# Patient Record
Sex: MALE | Race: WHITE | NOT HISPANIC OR LATINO | Employment: OTHER | URBAN - METROPOLITAN AREA
[De-identification: names, ages, dates, MRNs, and addresses within clinical notes are randomized per-mention and may not be internally consistent; named-entity substitution may affect disease eponyms.]

---

## 2017-02-08 ENCOUNTER — OFFICE VISIT (OUTPATIENT)
Dept: SLEEP MEDICINE | Facility: CLINIC | Age: 67
End: 2017-02-08
Payer: COMMERCIAL

## 2017-02-08 VITALS
DIASTOLIC BLOOD PRESSURE: 77 MMHG | BODY MASS INDEX: 29.4 KG/M2 | HEIGHT: 71 IN | OXYGEN SATURATION: 97 % | WEIGHT: 210 LBS | HEART RATE: 93 BPM | SYSTOLIC BLOOD PRESSURE: 148 MMHG

## 2017-02-08 DIAGNOSIS — G47.33 OBSTRUCTIVE SLEEP APNEA: Primary | ICD-10-CM

## 2017-02-08 DIAGNOSIS — G47.33 OSA (OBSTRUCTIVE SLEEP APNEA): ICD-10-CM

## 2017-02-08 PROBLEM — L40.9 PSORIASIS: Status: ACTIVE | Noted: 2017-01-26

## 2017-02-08 PROCEDURE — 99204 OFFICE O/P NEW MOD 45 MIN: CPT | Performed by: PHYSICIAN ASSISTANT

## 2017-02-08 NOTE — PATIENT INSTRUCTIONS

## 2017-02-08 NOTE — MR AVS SNAPSHOT
After Visit Summary   2/8/2017    Narinder Edmonds    MRN: [id number]           Patient Information     Date Of Birth          1950        Visit Information        Provider Department      2/8/2017 8:00 AM Jaclyn Gray PA [facility] [place]        Today's Diagnoses     Obstructive sleep apnea    -  1       Care Instructions      Your BMI is Body mass index is 29.3 kg/(m^2).  Weight management is a personal decision.  If you are interested in exploring weight loss strategies, the following discussion covers the approaches that may be successful. Body mass index (BMI) is one way to tell whether you are at a healthy weight, overweight, or obese. It measures your weight in relation to your height.  A BMI of 18.5 to 24.9 is in the healthy range. A person with a BMI of 25 to 29.9 is considered overweight, and someone with a BMI of 30 or greater is considered obese. More than two-thirds of American adults are considered overweight or obese.  Being overweight or obese increases the risk for further weight gain. Excess weight may lead to heart disease and diabetes.  Creating and following plans for healthy eating and physical activity may help you improve your health.  Weight control is part of healthy lifestyle and includes exercise, emotional health, and healthy eating habits. Careful eating habits lifelong are the mainstay of weight control. Though there are significant health benefits from weight loss, long-term weight loss with diet alone may be very difficult to achieve- studies show long-term success with dietary management in less than 10% of people. Attaining a healthy weight may be especially difficult to achieve in those with severe obesity. In some cases, medications, devices and surgical management might be considered.  What can you do?  If you are overweight or obese and are interested in methods for weight loss, you should discuss this with your provider.     Consider reducing  daily calorie intake by 500 calories.     Keep a food journal.     Avoiding skipping meals, consider cutting portions instead.    Diet combined with exercise helps maintain muscle while optimizing fat loss. Strength training is particularly important for building and maintaining muscle mass. Exercise helps reduce stress, increase energy, and improves fitness. Increasing exercise without diet control, however, may not burn enough calories to loose weight.       Start walking three days a week 10-20 minutes at a time    Work towards walking thirty minutes five days a week     Eventually, increase the speed of your walking for 1-2 minutes at time    In addition, we recommend that you review healthy lifestyles and methods for weight loss available through the National Institutes of Health patient information sites:  http://win.niddk.nih.gov/publications/index.htm    And look into health and wellness programs that may be available through your health insurance provider, employer, local community center, or joaquin club.    Weight management plan: Patient was referred to their PCP to discuss a diet and exercise plan.          Follow-ups after your visit        Who to contact     If you have questions or need follow up information about today's clinic visit or your schedule please contact [facility] directly at [phone].  Normal or non-critical lab and imaging results will be communicated to you by MyChart, letter or phone within 4 business days after the clinic has received the results. If you do not hear from us within 7 days, please contact the clinic through [employer] or phone. If you have a critical or abnormal lab result, we will notify you by phone as soon as possible.  Submit refill requests through [employer] or call your pharmacy and they will forward the refill request to us. Please allow 3 business days for your refill to be completed.          Additional Information About Your Visit       "  MyChart Information     [employer] lets you send messages to your doctor, view your test results, renew your prescriptions, schedule appointments and more. To sign up, go to [email or web address] . Click on \"Log in\" on the left side of the screen, which will take you to the Welcome page. Then click on \"Sign up Now\" on the right side of the page.     You will be asked to enter the access code listed below, as well as some personal information. Please follow the directions to create your username and password.     Your access code is: [email or web address]  Expires: 2017  8:43 AM     Your access code will  in 90 days. If you need help or a new code, please call your [facility] clinic or [phone].        Care EveryWhere ID     This is your Care EveryWhere ID. This could be used by other organizations to access your [facility] medical records  [id number]        Your Vitals Were     Pulse Height BMI (Body Mass Index) Pulse Oximetry          93 1.803 m (5' 11\") 29.30 kg/m2 97%         Blood Pressure from Last 3 Encounters:   17 149/81   12 129/73    Weight from Last 3 Encounters:   17 95.255 kg (210 lb)   12 90.719 kg (200 lb)              We Performed the Following     Comprehensive DME        Primary Care Provider Office Phone # Fax #    Jeff Beltran [phone]       [facility] [address] [address]        Thank you!     Thank you for choosing [facility]  for your care. Our goal is always to provide you with excellent care. Hearing back from our patients is one way we can continue to improve our services. Please take a few minutes to complete the written survey that you may receive in the mail after your visit with us. Thank you!             Your Updated Medication List - Protect others around you: Learn how to safely use, store and throw away your medicines at www.disposemymeds.org.          This list is accurate as of: " 2/8/17  8:43 AM.  Always use your most recent med list.                   Brand Name Dispense Instructions for use    ACETAMINOPHEN PO      Take  by mouth.       * clobetasol propionate 0.05 % Sham      Externally apply  topically.       * Clobetasol 17 Propionate Powd          * clobetasol propionate 0.05 % Sham      AS PRESCRIBED       * Clobetasol Propionate Powd      AS PRESCRIBED       * DERMAZINC SPRAY EX      Externally apply  topically.       * DERMAZINC SPRAY 0.25 % Liqd   Generic drug:  Pyrithione Zinc      AS DIRECTED 1-2 TIMES DAILY       FELDENE 20 MG capsule   Generic drug:  piroxicam      1 CAPSULE ORALLY DAILY       FLEXERIL PO      Take  by mouth.       fluticasone 27.5 MCG/SPRAY spray    VERAMYST     Spray 2 sprays in nostril daily.       Multivitamin  /Iron Tabs      None Entered       PRILOSEC PO      Take  by mouth.       SYNTHROID PO      Take  by mouth.       tobramycin-dexamethasone 0.3-0.1 % ophthalmic susp    TOBRADEX     1 drop every 4 hours (while awake).       VITAMIN C PO      Take  by mouth.       * Notice:  This list has 6 medication(s) that are the same as other medications prescribed for you. Read the directions carefully, and ask your doctor or other care provider to review them with you.

## 2017-02-08 NOTE — NURSING NOTE
"Chief Complaint   Patient presents with     Sleep Problem     Consult re-establish care, has CPAP, check pressures       Initial /81 mmHg  Pulse 93  Ht 1.803 m (5' 11\")  Wt 95.255 kg (210 lb)  BMI 29.30 kg/m2  SpO2 97% Estimated body mass index is 29.3 kg/(m^2) as calculated from the following:    Height as of this encounter: 1.803 m (5' 11\").    Weight as of this encounter: 95.255 kg (210 lb).  Medication Reconciliation: complete    "

## 2017-02-08 NOTE — PROGRESS NOTES
Sleep Consultation:    Date on this visit: 2/8/2017      Primary Physician: Jeff Beltran     Chief Complaint   Patient presents with     Sleep Problem     Consult re-establish care, has CPAP, check pressures       Narinder YARI Edmonds is a 66 year old male who had a sleep study at [facility] in October 2010(AHI 26, lowest oxygen saturation was 83%) for excessive daytime sleepiness. He was prescribed auto-CPAP 8-12 cm/H2O. He underwent an all night PAP titration on 6/19/2011(205#, CPAP 15 cm/H20). He is currently using 9-15 cm/H20.  Compliance data is available today. Compliance download recorded 30 days, 0 days of no use, 30 days of use.  Average time of PAP use on the days used is 6 hours 1 minutes. Average large leak was 2 minutes 50 seconds. AHI 4.3 events per hour.  Narinder is tolerating their CPAP well. His original complaints of excessive daytime sleepiness have improved with treatment but not completely remedied with reported ESS of 20.  PAP Mask is a  full face.  DME is [facility].   Narinder  does not snore when wearing his  CPAP. Patient's bed partner does not complain of snoring, kicking, punching, gasping, choking and snorting. Narinder sleeps on his back and side. He does not have witnessed apneas when wearing their CPAP. He has occasional morning headaches, denies no morning dry mouth, morning confusion and restless legs. He denies any bruxism, sleep walking, sleep talking, dream enactment, sleep paralysis, cataplexy and hypnogogic/hypnopompic hallucinations.    Narinder has gained 5 pounds since their last sleep study.    Narinder goes to sleep at 10:00 PM during the week. He wakes up at 4:45 AM with an alarm. He falls asleep in 10-15 minutes.  Narinder denies difficulty falling asleep.  He wakes up 2-4 times a night for 10-15 minutes before falling back to sleep.  Narinder wakes up mostly due to nasal obstruction. For the nasal obstruction, he has seen ENT and will be undergoing septoturbinoplasty in the near future.  on weekends, Narinder goes to sleep at 11:00 PM.  He wakes up at 8:00 AM without an alarm. He falls asleep in 10 minutes.  Patient gets an average of 4.5 to 6.5 hours of sleep per night.     Patient does watch TV in bed and does not use electronics in bed and read in bed.     Narinder does not do shift work.     Patient describes themself as a morning person.  He would prefer to go to sleep at 10:00 PM and wake up at 6:30 AM.  Patient's [place] Sleepiness score 20/24 consistent with excessive  daytime sleepiness.      Narinder naps only occasionally.  He takes some inadvertant naps.  He denies falling asleep while driving.   Patient was counseled on the importance of driving while alert, to pull over if drowsy, or nap before getting into the vehicle if sleepy.  He uses no caffeine.  Allergies:    Allergies   Allergen Reactions     Codeine Swelling     Edema     Codeine Sulfate Swelling     Percocet [Oxycodone-Acetaminophen] Rash       Medications:    Current Outpatient Prescriptions   Medication Sig Dispense Refill     ACETAMINOPHEN PO Take  by mouth.       Ascorbic Acid (VITAMIN C PO) Take  by mouth.       clobetasol propionate 0.05 % SHAM Externally apply  topically.       Clobetasol Propionate (CLOBETASOL 17 PROPIONATE) POWD        Cyclobenzaprine HCl (FLEXERIL PO) Take  by mouth.       fluticasone (VERAMYST) 27.5 MCG/SPRAY nasal spray Spray 2 sprays in nostril daily.       Levothyroxine Sodium (SYNTHROID PO) Take  by mouth.       Omeprazole (PRILOSEC PO) Take  by mouth.       Pyrithione Zinc (DERMAZINC SPRAY EX) Externally apply  topically.       tobramycin-dexamethasone (TOBRADEX) ophthalmic solution 1 drop every 4 hours (while awake).       CLOBETASOL PROPIONATE 0.05 % EX SHAM AS PRESCRIBED       CLOBETASOL PROPIONATE POWD AS PRESCRIBED       FELDENE 20 MG OR CAPS 1 CAPSULE ORALLY DAILY       DERMAZINC SPRAY 0.25 % EX LIQD AS DIRECTED 1-2 TIMES DAILY       MULTIVITAMIN/IRON OR TABS None Entered         Problem  List:  Patient Active Problem List    Diagnosis Date Noted     Psoriasis 01/26/2017     Priority: Medium     Sicca syndrome (H) 04/02/2015     Priority: Medium     Sensorineural hearing loss 01/29/2015     Priority: Medium     Degeneration of intervertebral disc of lumbar region 04/12/2012     Priority: Medium     Horseshoe kidney 03/20/2012     Priority: Medium     GISELLE (obstructive sleep apnea) 03/25/2010     Priority: Medium     sleep study at [facility] in October 2010(AHI 26, lowest oxygen saturation was 83%) for excessive daytime sleepiness. He was prescribed auto-CPAP 8-12 cm/H2O.   He underwent an all night PAP titration on 6/19/2011(205#, CPAP 15 cm/H20).       Hypertension 03/17/2009     Priority: Medium     Gastroesophageal reflux disease 12/07/2006     Priority: Medium     Acquired hypothyroidism 12/07/2006     Priority: Medium        Past Medical/Surgical History:  No past medical history on file.  Past Surgical History   Procedure Laterality Date     Esophagoscopy, gastroscopy, duodenoscopy (egd), combined  4/9/2012     Procedure:COMBINED ESOPHAGOSCOPY, GASTROSCOPY, DUODENOSCOPY (EGD); Gastroscopy; Surgeon:DELMAR CAMPBELL; Location[facility]       Social History:  Social History     Social History     Marital Status:      Spouse Name: N/A     Number of Children: N/A     Years of Education: N/A     Occupational History     Not on file.     Social History Main Topics     Smoking status: Never Smoker      Smokeless tobacco: Not on file     Alcohol Use: Not on file     Drug Use: Not on file     Sexual Activity: Not on file     Other Topics Concern     Not on file     Social History Narrative       Family History:  No family history on file.    Review of Systems:  A complete review of systems reviewed by me is negative with the exeption of what has been mentioned in the history of present illness.  CONSTITUTIONAL:  POSITIVE for  drug allergies see list  EYES: NEGATIVE for changes in vision, blind spots,  "double vision.  ENT:  POSITIVE for  sinus pain and runny nose  CARDIAC: NEGATIVE for fast heartbeats or fluttering in chest, chest pain or pressure, breathlessness when lying flat, swollen legs or swollen feet.  NEUROLOGIC:  POSITIVE for  headaches  DERMATOLOGIC:  POSITIVE for  new mole(s) and change in mole(s)  PULMONARY:  POSITIVE for  SOB with activity, dry cough and productive cough  GASTROINTESTINAL: NEGATIVE for nausea or vomitting, loose or watery stools, fat or grease in stools, constipation, abdominal pain, bowel movements black in color or blood noted.  GENITOURINARY:  POSITIVE for  blood in urine and urinating more frequently than usual  MUSCULOSKELETAL:  POSITIVE for  muscle pain and bone or joint pain  ENDOCRINE:  POSITIVE for  increased thirst  LYMPHATIC: NEGATIVE for swollen lymph nodes, lumps or bumps in the breasts or nipple discharge.    Physical Examination:  Vitals: /80 mmHg  Pulse 93  Ht 1.803 m (5' 11\")  Wt 95.255 kg (210 lb)  BMI 29.30 kg/m2  SpO2 97%  BMI= Body mass index is 29.3 kg/(m^2).    Neck Cir (cm): 42 cm    [place] Total Score 2/8/2017   Total score - [place] 20       GENERAL APPEARANCE: alert and no distress  EYES: Eyes grossly normal to inspection, PERRL and conjunctivae and sclerae normal  HENT: ear canals and TM's normal, nose and mouth without ulcers or lesions and oropharynx crowded  NECK: no adenopathy, no asymmetry, masses, or scars and thyroid normal to palpation  RESP: lungs clear to auscultation - no rales, rhonchi or wheezes  CV: regular rates and rhythm and normal S1 S2, no S3 or S4  LYMPHATICS: normal ant/post cervical and supraclavicular nodes  MS: extremities normal- no gross deformities noted  NEURO: Normal strength and tone, mentation intact and speech normal  PSYCH: mentation appears normal and affect normal/bright  Mallampati Class: IV.  Tonsillar Stage: 1  hidden by pillars.    Impression/Plan:  1. Moderate obstructive sleep apnea.The patient is " showing a good degree of compliance and GISELLE is controlled.   2. Profound daytime sleepiness without features of narcolepsy  3. Possible sleep deprivation on weekdays.     Today we discussed options. He will undergo septoturbinoplasty to see if that alleviates nocturnal awakenings. If after the surgery, he continues to experience difficulty maintaining sleep, will consider a sedative hypnotic or CBT-I. If he is sleeping through the night for 7-8 hour and continues to have excessive daytime sleepiness, I will have him undergo hypersomnia work up with actigraphy x2 weeks, PSG/MSLT and UDS.   We also discussed the potential for inadequate sleep time contributing to excessive daytime sleepiness and counseled the patient to extend sleep time (aim for 7-8 hours of sleep), We also reviewed methods to extend sleep time.  Do not drive while drowsy.  If you feel like you are going to doze while driving, pull off to the shoulder and take a nap.    Literature provided regarding sleep apnea.      He will follow up with me in 2-3 months.       Jaclyn Gray PA-C      CC: No ref. provider found

## 2017-09-12 ENCOUNTER — OFFICE VISIT (OUTPATIENT)
Dept: SLEEP MEDICINE | Facility: CLINIC | Age: 67
End: 2017-09-12
Payer: COMMERCIAL

## 2017-09-12 VITALS
HEART RATE: 77 BPM | DIASTOLIC BLOOD PRESSURE: 60 MMHG | HEIGHT: 70 IN | OXYGEN SATURATION: 96 % | SYSTOLIC BLOOD PRESSURE: 118 MMHG | WEIGHT: 203 LBS | BODY MASS INDEX: 29.06 KG/M2 | RESPIRATION RATE: 16 BRPM

## 2017-09-12 DIAGNOSIS — G47.33 OSA (OBSTRUCTIVE SLEEP APNEA): Primary | ICD-10-CM

## 2017-09-12 PROBLEM — I48.91 ATRIAL FIBRILLATION WITH RAPID VENTRICULAR RESPONSE (H): Status: ACTIVE | Noted: 2017-09-01

## 2017-09-12 PROBLEM — I48.91 ATRIAL FIBRILLATION WITH RAPID VENTRICULAR RESPONSE (H): Chronic | Status: ACTIVE | Noted: 2017-09-01

## 2017-09-12 PROCEDURE — 99214 OFFICE O/P EST MOD 30 MIN: CPT | Performed by: PHYSICIAN ASSISTANT

## 2017-09-12 RX ORDER — METOPROLOL TARTRATE 50 MG
50 TABLET ORAL
COMMUNITY
Start: 2017-09-03

## 2017-09-12 NOTE — MR AVS SNAPSHOT
After Visit Summary   9/12/2017    Narinder Edmonds    MRN: [id number]           Patient Information     Date Of Birth          1950        Visit Information        Provider Department      9/12/2017 3:00 PM Jaclyn Gray PA [facility]        Today's Diagnoses     GISELLE (obstructive sleep apnea)    -  1      Care Instructions    Read the book Say Good Night to Insomnia by Abraham Ash.     Your BMI is Body mass index is 29.13 kg/(m^2).  Weight management is a personal decision.  If you are interested in exploring weight loss strategies, the following discussion covers the approaches that may be successful. Body mass index (BMI) is one way to tell whether you are at a healthy weight, overweight, or obese. It measures your weight in relation to your height.  A BMI of 18.5 to 24.9 is in the healthy range. A person with a BMI of 25 to 29.9 is considered overweight, and someone with a BMI of 30 or greater is considered obese. More than two-thirds of American adults are considered overweight or obese.  Being overweight or obese increases the risk for further weight gain. Excess weight may lead to heart disease and diabetes.  Creating and following plans for healthy eating and physical activity may help you improve your health.  Weight control is part of healthy lifestyle and includes exercise, emotional health, and healthy eating habits. Careful eating habits lifelong are the mainstay of weight control. Though there are significant health benefits from weight loss, long-term weight loss with diet alone may be very difficult to achieve- studies show long-term success with dietary management in less than 10% of people. Attaining a healthy weight may be especially difficult to achieve in those with severe obesity. In some cases, medications, devices and surgical management might be considered.  What can you do?  If you are overweight or obese and are interested in methods for weight  loss, you should discuss this with your provider.     Consider reducing daily calorie intake by 500 calories.     Keep a food journal.     Avoiding skipping meals, consider cutting portions instead.    Diet combined with exercise helps maintain muscle while optimizing fat loss. Strength training is particularly important for building and maintaining muscle mass. Exercise helps reduce stress, increase energy, and improves fitness. Increasing exercise without diet control, however, may not burn enough calories to loose weight.       Start walking three days a week 10-20 minutes at a time    Work towards walking thirty minutes five days a week     Eventually, increase the speed of your walking for 1-2 minutes at time    In addition, we recommend that you review healthy lifestyles and methods for weight loss available through the National Institutes of Health patient information sites:  http://win.niddk.nih.gov/publications/index.htm    And look into health and wellness programs that may be available through your health insurance provider, employer, local community center, or joaquin club.    Weight management plan: Patient was referred to their PCP to discuss a diet and exercise plan.            Follow-ups after your visit        Follow-up notes from your care team     Return in about 1 year (around 9/12/2018).      Your next 10 appointments already scheduled     Sep 13, 2017  9:45 AM CDT   [facility]    [address] [place] [address]   [phone]            Sep 25, 2017  3:30 PM CDT   SLEEP DME MASK FITTING with [facility] DME   [facility] ([facility])    [address]  [facility] [address]   [phone]            Sep 25, 2017  4:00 PM CDT   Oximetry  with [facility] DME   [facility] ([facility])    [address]  [place] [address]   [phone]            Sep 26, 2017  8:00 AM CDT   Oximetry Drop Off with CL SC DME   [facility] ([facility])    [address]  [place] [address]   [phone]              Future tests that were ordered for you today     Open Future Orders        Priority Expected Expires Ordered    Overnight oximetry study Routine  3/11/2018 2017            Who to contact     If you have questions or need follow up information about today's clinic visit or your schedule please contact [facility] directly at [phone].  Normal or non-critical lab and imaging results will be communicated to you by [email or web address], letter or phone within 4 business days after the clinic has received the results. If you do not hear from us within 7 days, please contact the clinic through [email or web address] or phone. If you have a critical or abnormal lab result, we will notify you by phone as soon as possible.  Submit refill requests through [facility] or call your pharmacy and they will forward the refill request to us. Please allow 3 business days for your refill to be completed.          Additional Information About Your Visit        [email or web address] Information     [facility] lets you send messages to your doctor, view your test results, renew your prescriptions, schedule appointments and more. To sign up, go to [email or web address] . Click on \"Log in\" on the left side of the screen, which will take you to the Welcome page. Then click on \"Sign up Now\" on the right side of the page.     You will be asked to enter the access code listed below, as well as some personal information. Please follow the directions to create your username and password.     Your access code is: [email or web address]  Expires: 2017  3:51 PM     Your access code will  in 90 days. If you need help or a new code, please call your [facility] or [phone].        Care EveryWhere ID     " "This is your Care EveryWhere ID. This could be used by other organizations to access your [facility] medical records  [id number]        Your Vitals Were     Pulse Respirations Height Pulse Oximetry BMI (Body Mass Index)       77 16 1.778 m (5' 10\") 96% 29.13 kg/m2        Blood Pressure from Last 3 Encounters:   09/12/17 118/60   02/08/17 132/80   04/09/12 129/73    Weight from Last 3 Encounters:   09/12/17 92.1 kg (203 lb)   02/08/17 95.3 kg (210 lb)   04/09/12 90.7 kg (200 lb)              We Performed the Following     Comprehensive DME        Primary Care Provider Office Phone # Fax #    Jeff Beltran [phone]       [place] [id number] [facility] [address]        Equal Access to Services     [facility]: Hadii aad ku hadasho Sooctavio, waaxda luqadaha, qaybta kaalmada aderenyada, taj nelson . So [place] [phone].    ATENCIÓN: Si habla español, tiene a ferris disposición servicios gratuitos de asistencia lingüística. Esteban al [phone].    We comply with applicable federal civil rights laws and [place] laws. We do not discriminate on the basis of race, color, national origin, age, disability sex, sexual orientation or gender identity.            Thank you!     Thank you for choosing [facility]  for your care. Our goal is always to provide you with excellent care. Hearing back from our patients is one way we can continue to improve our services. Please take a few minutes to complete the written survey that you may receive in the mail after your visit with us. Thank you!             Your Updated Medication List - Protect others around you: Learn how to safely use, store and throw away your medicines at www.disposemymeds.org.          This list is accurate as of: 9/12/17  3:51 PM.  Always use your most recent med list.                   Brand Name Dispense Instructions for use Diagnosis    ACETAMINOPHEN PO      Take  by " mouth.        * clobetasol propionate 0.05 % Sham      Externally apply  topically.        * Clobetasol 17 Propionate Powd           * clobetasol propionate 0.05 % Sham      AS PRESCRIBED        * Clobetasol Propionate Powd      AS PRESCRIBED        * DERMAZINC SPRAY EX      Externally apply  topically.        * DERMAZINC SPRAY 0.25 % Liqd   Generic drug:  Pyrithione Zinc      AS DIRECTED 1-2 TIMES DAILY        FELDENE 20 MG capsule   Generic drug:  piroxicam      1 CAPSULE ORALLY DAILY        FLEXERIL PO      Take  by mouth.        fluticasone 27.5 MCG/SPRAY spray    VERAMYST     Spray 2 sprays in nostril daily.        metoprolol 50 MG tablet    LOPRESSOR     Take 50 mg by mouth        Multivitamin  /Iron Tabs      None Entered        PRILOSEC PO      Take  by mouth.        rivaroxaban ANTICOAGULANT 20 MG Tabs tablet    XARELTO     Take 20 mg by mouth        SYNTHROID PO      Take  by mouth.        tobramycin-dexamethasone 0.3-0.1 % ophthalmic susp    TOBRADEX     1 drop every 4 hours (while awake).        VITAMIN C PO      Take  by mouth.        * Notice:  This list has 6 medication(s) that are the same as other medications prescribed for you. Read the directions carefully, and ask your doctor or other care provider to review them with you.

## 2017-09-12 NOTE — NURSING NOTE
"No chief complaint on file.      Initial /60  Pulse 77  Resp 16  Ht 1.778 m (5' 10\")  Wt 92.1 kg (203 lb)  SpO2 96%  BMI 29.13 kg/m2 Estimated body mass index is 29.13 kg/(m^2) as calculated from the following:    Height as of this encounter: 1.778 m (5' 10\").    Weight as of this encounter: 92.1 kg (203 lb).  Medication Reconciliation: complete   Theresa Scott CMA       "

## 2017-09-12 NOTE — PATIENT INSTRUCTIONS
Read the book Say Good Night to Insomnia by Abraham Ash.     Your BMI is Body mass index is 29.13 kg/(m^2).  Weight management is a personal decision.  If you are interested in exploring weight loss strategies, the following discussion covers the approaches that may be successful. Body mass index (BMI) is one way to tell whether you are at a healthy weight, overweight, or obese. It measures your weight in relation to your height.  A BMI of 18.5 to 24.9 is in the healthy range. A person with a BMI of 25 to 29.9 is considered overweight, and someone with a BMI of 30 or greater is considered obese. More than two-thirds of American adults are considered overweight or obese.  Being overweight or obese increases the risk for further weight gain. Excess weight may lead to heart disease and diabetes.  Creating and following plans for healthy eating and physical activity may help you improve your health.  Weight control is part of healthy lifestyle and includes exercise, emotional health, and healthy eating habits. Careful eating habits lifelong are the mainstay of weight control. Though there are significant health benefits from weight loss, long-term weight loss with diet alone may be very difficult to achieve- studies show long-term success with dietary management in less than 10% of people. Attaining a healthy weight may be especially difficult to achieve in those with severe obesity. In some cases, medications, devices and surgical management might be considered.  What can you do?  If you are overweight or obese and are interested in methods for weight loss, you should discuss this with your provider.     Consider reducing daily calorie intake by 500 calories.     Keep a food journal.     Avoiding skipping meals, consider cutting portions instead.    Diet combined with exercise helps maintain muscle while optimizing fat loss. Strength training is particularly important for building and maintaining muscle mass.  Exercise helps reduce stress, increase energy, and improves fitness. Increasing exercise without diet control, however, may not burn enough calories to loose weight.       Start walking three days a week 10-20 minutes at a time    Work towards walking thirty minutes five days a week     Eventually, increase the speed of your walking for 1-2 minutes at time    In addition, we recommend that you review healthy lifestyles and methods for weight loss available through the National Institutes of Health patient information sites:  http://win.niddk.nih.gov/publications/index.htm    And look into health and wellness programs that may be available through your health insurance provider, employer, local community center, or joaquin club.    Weight management plan: Patient was referred to their PCP to discuss a diet and exercise plan.

## 2017-09-12 NOTE — PROGRESS NOTES
Obstructive Sleep Apnea- PAP Follow-Up Visit:    Chief Complaint   Patient presents with     CPAP Follow Up       Narinder Edmonds comes in today for follow-up of their moderate sleep apnea, managed with CPAP.     Narinder Edmonds is a 66 year old male who had a sleep study at [facility] in October 2010(AHI 26, lowest oxygen saturation was 83%) for excessive daytime sleepiness. He was prescribed auto-CPAP 8-12 cm/H2O. He underwent an all night PAP titration on 6/19/2011(205#, CPAP 15 cm/H20). He is currently on CPAP 9-15 cm/H20.     Since last clinic visit with me patient underwent septoturbinoplasty for nasal obstruction. He was admitted on 8/31/2017 for cellulitis of the left leg. During that admission he was noted to have Afib with RVR which was converted to NSR via IV Diltiazem.     Overall, the patient rates his experience with PAP as 6 (0 poor, 10 great). The mask is not comfortable. The mask is not leaking.  He is not snoring with the mask on. He is not having gasp arousals. He is not having any oral or nasal dryness. The pressure settings are comfortable    His PAP interface is Full Face Mask.    Bedtime is typically 10 PM. Usually it takes about 15 minutes to fall asleep with the mask on. Wake time is typically 5 AM, but recenly been waking up at 3 AM and unable to fall back to sleep.  Patient is using PAP therapy 6 hours per night. The patient is usually getting 5-6 hours of sleep per night.    He does not feel rested in the morning.    [place] Sleepiness Scale: 14/24(baseline 20). No difficulty driving.     Respironics  Auto-PAP 9 - 15 cmH2O 30 day usage data:    100% of days with > 4 hours of use. 30/30 days with no use. Average use 6 hours and 25 minutes per day.    Average in large leak 10 seconds%.    CPAP 90% pressure 12.4cm.   AHI 4.1 events per hour.    Past medical/surgical history, family history, social history, medications and allergies were reviewed.      Problem List:  Patient Active Problem List  "   Diagnosis Date Noted     Atrial fibrillation with rapid ventricular response (H) 09/01/2017     Priority: Medium     Psoriasis 01/26/2017     Priority: Medium     Sicca syndrome (H) 04/02/2015     Priority: Medium     Sensorineural hearing loss 01/29/2015     Priority: Medium     Degeneration of intervertebral disc of lumbar region 04/12/2012     Priority: Medium     Horseshoe kidney 03/20/2012     Priority: Medium     GISELLE (obstructive sleep apnea) 03/25/2010     Priority: Medium     sleep study at [facility] in October 2010(AHI 26, lowest oxygen saturation was 83%) for excessive daytime sleepiness. He was prescribed auto-CPAP 8-12 cm/H2O.   He underwent an all night PAP titration on 6/19/2011(205#, CPAP 15 cm/H20).       Hypertension 03/17/2009     Priority: Medium     Gastroesophageal reflux disease 12/07/2006     Priority: Medium     Acquired hypothyroidism 12/07/2006     Priority: Medium        /60  Pulse 77  Resp 16  Ht 1.778 m (5' 10\")  Wt 92.1 kg (203 lb)  SpO2 96%  BMI 29.13 kg/m2    Impression/Plan:  1. Moderate Obstructive Sleep Apnea-  The patient is showing a good degree of compliance, adequate control with residual AHI of 4 and is receiving improvement in sleep quality at this point in time.   He will work on alternative mask options today.  Continue current treatment.   Overnight oximetry to ensure that he is not having desaturations in the setting of recent diagnosis of Afib.      2. Psychophysiological insomnia-  His sleep quality is improving but reports shortened sleep times due to waking in the AM before his alarm and starting to think. He is not interested in pharmacotherapy. Initially recommend reading the book Say Good Night to Insomnia.     Narinder Edmonds will follow up in about 1 year, sooner if questions/concerns    Twenty-five minutes spent with patient, all of which were spent face-to-face counseling, consulting, coordinating plan of care.      Jaclyn Gray PA-C      CC:  " Jeff Beltran

## 2017-09-13 ENCOUNTER — TELEPHONE (OUTPATIENT)
Dept: DERMATOLOGY | Facility: CLINIC | Age: 67
End: 2017-09-13

## 2017-09-13 ENCOUNTER — OFFICE VISIT (OUTPATIENT)
Dept: DERMATOLOGY | Facility: CLINIC | Age: 67
End: 2017-09-13
Payer: COMMERCIAL

## 2017-09-13 VITALS — DIASTOLIC BLOOD PRESSURE: 71 MMHG | OXYGEN SATURATION: 98 % | SYSTOLIC BLOOD PRESSURE: 143 MMHG | HEART RATE: 74 BPM

## 2017-09-13 DIAGNOSIS — C44.329 SQUAMOUS CELL CANCER OF SKIN OF LEFT TEMPLE: ICD-10-CM

## 2017-09-13 DIAGNOSIS — C76.1 PRIMARY SQUAMOUS CELL CARCINOMA OF CHEST WALL (H): Primary | ICD-10-CM

## 2017-09-13 DIAGNOSIS — L81.4 LENTIGO: ICD-10-CM

## 2017-09-13 DIAGNOSIS — L82.0 INFLAMED SEBORRHEIC KERATOSIS: ICD-10-CM

## 2017-09-13 DIAGNOSIS — L82.1 SK (SEBORRHEIC KERATOSIS): ICD-10-CM

## 2017-09-13 DIAGNOSIS — L40.9 PSORIASIS: ICD-10-CM

## 2017-09-13 PROCEDURE — 88331 PATH CONSLTJ SURG 1 BLK 1SPC: CPT | Performed by: DERMATOLOGY

## 2017-09-13 PROCEDURE — 11101 CL FROZEN SECTION FIRST SPEC: CPT | Performed by: DERMATOLOGY

## 2017-09-13 PROCEDURE — 11100 HC BIOPSY SKIN/SUBQ/MUC MEM, EACH ADDTL LESION: CPT | Mod: 59 | Performed by: DERMATOLOGY

## 2017-09-13 PROCEDURE — 17110 DESTRUCTION B9 LES UP TO 14: CPT | Mod: 51 | Performed by: DERMATOLOGY

## 2017-09-13 PROCEDURE — 99203 OFFICE O/P NEW LOW 30 MIN: CPT | Mod: 25 | Performed by: DERMATOLOGY

## 2017-09-13 RX ORDER — FLUOCINONIDE TOPICAL SOLUTION USP, 0.05% 0.5 MG/ML
SOLUTION TOPICAL
COMMUNITY

## 2017-09-13 RX ORDER — CICLOPIROX OLAMINE 7.7 MG/G
CREAM TOPICAL 2 TIMES DAILY
Qty: 90 G | Refills: 3 | Status: SHIPPED | OUTPATIENT
Start: 2017-09-13

## 2017-09-13 RX ORDER — CLOBETASOL PROPIONATE 0.5 MG/ML
SOLUTION TOPICAL
Qty: 100 ML | Refills: 3 | Status: SHIPPED | OUTPATIENT
Start: 2017-09-13 | End: 2018-12-06

## 2017-09-13 NOTE — PROGRESS NOTES
Narinder Edmonds is a 66 year old year old male patient here today for growths on skin and hx of scalp psoriasis.  Using lidex with some help.   .  Patient states this has been present for years.  Patient reports the following symptoms:  Scale in ears and scalp. .  Patient reports the following previous treatments none.  Patient reports the following modifying factors none.  Associated symptoms: none.  Patient has no other skin complaints today.  Remainder of the HPI, Meds, PMH, Allergies, FH, and SH was reviewed in chart.    History reviewed. No pertinent past medical history.    Past Surgical History:   Procedure Laterality Date     ESOPHAGOSCOPY, GASTROSCOPY, DUODENOSCOPY (EGD), COMBINED  4/9/2012    Procedure:COMBINED ESOPHAGOSCOPY, GASTROSCOPY, DUODENOSCOPY (EGD); Gastroscopy; Surgeon:DELMAR CAMPBELL; Location:[facility]        History reviewed. No pertinent family history.    Social History     Social History     Marital status:      Spouse name: N/A     Number of children: N/A     Years of education: N/A     Occupational History     Not on file.     Social History Main Topics     Smoking status: Never Smoker     Smokeless tobacco: Never Used     Alcohol use Not on file     Drug use: Not on file     Sexual activity: Not on file     Other Topics Concern     Not on file     Social History Narrative       Outpatient Encounter Prescriptions as of 9/13/2017   Medication Sig Dispense Refill     clobetasol (TEMOVATE) 0.05 % external solution Apply sparingly to affected area twice daily on scalp. Do not apply to face. 100 mL 3     metoprolol (LOPRESSOR) 50 MG tablet Take 50 mg by mouth       rivaroxaban ANTICOAGULANT (XARELTO) 20 MG TABS tablet Take 20 mg by mouth       ACETAMINOPHEN PO Take  by mouth.       Ascorbic Acid (VITAMIN C PO) Take  by mouth.       Cyclobenzaprine HCl (FLEXERIL PO) Take  by mouth.       fluticasone (VERAMYST) 27.5 MCG/SPRAY nasal spray Spray 2 sprays in nostril daily.       Levothyroxine  Sodium (SYNTHROID PO) Take  by mouth.       Omeprazole (PRILOSEC PO) Take  by mouth.       tobramycin-dexamethasone (TOBRADEX) ophthalmic solution 1 drop every 4 hours (while awake).       MULTIVITAMIN/IRON OR TABS None Entered       fluocinonide (LIDEX) 0.05 % solution        clobetasol propionate 0.05 % SHAM Externally apply  topically.       Clobetasol Propionate (CLOBETASOL 17 PROPIONATE) POWD        Pyrithione Zinc (DERMAZINC SPRAY EX) Externally apply  topically.       CLOBETASOL PROPIONATE 0.05 % EX SHAM AS PRESCRIBED       CLOBETASOL PROPIONATE POWD AS PRESCRIBED       FELDENE 20 MG OR CAPS 1 CAPSULE ORALLY DAILY       DERMAZINC SPRAY 0.25 % EX LIQD AS DIRECTED 1-2 TIMES DAILY       No facility-administered encounter medications on file as of 9/13/2017.              Review Of Systems  Skin: As above  Eyes: negative  Ears/Nose/Throat: negative  Respiratory: No shortness of breath, dyspnea on exertion, cough, or hemoptysis  Cardiovascular: negative  Gastrointestinal: negative  Genitourinary: negative  Musculoskeletal: negative  Neurologic: negative  Psychiatric: negative  Hematologic/Lymphatic/Immunologic: negative  Endocrine: negative      O:   NAD, WDWN, Alert & Oriented, Mood & Affect wnl, Vitals stable   Here today alone   /71  Pulse 74  SpO2 98%   General appearance normal   Vitals stable   Alert, oriented and in no acute distress      Following lymph nodes palpated: Occipital, Cervical, Supraclavicular no lad   Scaly plaque son scalp   Stuck on papules and brown macules on trunk and ext    R flank inflamed seborrheic keratosis    L chest 1cm keraottic papule   R temple 6mm scaly papule           The remainder of the full exam was unremarkable; the following areas were examined:  conjunctiva/lids, oral mucosa, neck, peripheral vascular system, abdomen, lymph nodes, digits/nails, eccrine and apocrine glands, scalp/hair, face, neck, chest, abdomen, buttocks, back, RUE, LUE, RLE, LLE       Eyes:  Conjunctivae/lids:Normal     ENT: Lips, buccal mucosa, tongue: normal    MSK:Normal    Cardiovascular: peripheral edema none    Pulm: Breathing Normal    Lymph Nodes: No Head and Neck Lymphadenopathy     Neuro/Psych: Orientation:Normal; Mood/Affect:Normal      MICRO:     L chest:There is a proliferation of irregular nests of abnormal squamous cells arising from the epidermis and invading the dermis. These are well differentiated. The dermis shows a variable superficial perivascular inflammatory infiltrate.   R temple:There is a proliferation of irregular nests of abnormal squamous cells arising from the epidermis and invading the dermis. These are well differentiated. The dermis shows a variable superficial perivascular inflammatory infiltrate.   A/P:  1. Scalp psoriasis  Increase to clobetasol prn  Loprox bid given shaved head  2. Seborrheic keratosis, lentigo  3. R flank inflamed seborrheic keratosis  LN2:  Treated with LN2 for 5s for 1-2 cycles. Warned risks of blistering, pain, pigment change, scarring, and incomplete resolution.  Advised patient to return if lesions do not completely resolve.  Wound care sheet given.  4. R/o squamous cell carcinoma   TANGENTIAL BIOPSY IN HOUSE:  After consent, anesthesia with LEC and prep, tangential excision performed and dx above confirmed with frozen section histology.  No complications and routine wound care.  Patient told result   L chest squamous cell carcinoma   R temple squamous cell carcinoma   Schedule excision       BENIGN LESIONS DISCUSSED WITH PATIENT:  I discussed the specifics of tumor, prognosis, and genetics of benign lesions.  I explained that treatment of these lesions would be purely cosmetic and not medically neccessary.  I discussed with patient different removal options including excision, cautery and /or laser.      Nature and genetics of benign skin lesions dicussed with patient.  Signs and Symptoms of skin cancer discussed with patient.  Fermin of  melanoma reviewed with patient.  Patient encouraged to perform monthly skin exams.  UV precautions reviewed with patient.  Skin care regimen reviewed with patient: Eliminate harsh soaps, i.e. Dial, zest, irsih spring; Mild soaps such as Cetaphil or Dove sensitive skin, avoid hot or cold showers, aggressive use of emollients including vanicream, cetaphil or cerave discussed with patient.    Risks of non-melanoma skin cancer discussed with patient   Return to clinic 6 months

## 2017-09-13 NOTE — TELEPHONE ENCOUNTER
----- Message from Ike Edmonds MD sent at 9/13/2017 11:40 AM CDT -----  L chest squamous cell carcinoma   R temple squamous cell carcinoma   Schedule excision

## 2017-09-13 NOTE — PATIENT INSTRUCTIONS
Wound Care Instructions     FOR SUPERFICIAL WOUNDS     [facility] [phone]    [facility] [phone]                       AFTER 24 HOURS YOU SHOULD REMOVE THE BANDAGE AND BEGIN DAILY DRESSING CHANGES AS FOLLOWS:     1) Remove Dressing.     2) Clean and dry the area with tap water using a Q-tip or sterile gauze pad.     3) Apply Vaseline, Aquaphor, Polysporin ointment or Bacitracin ointment over entire wound.  Do NOT use Neosporin ointment.     4) Cover the wound with a band-aid, or a sterile non-stick gauze pad and micropore paper tape      REPEAT THESE INSTRUCTIONS AT LEAST ONCE A DAY UNTIL THE WOUND HAS COMPLETELY HEALED.    It is an old wives tale that a wound heals better when it is exposed to air and allowed to dry out. The wound will heal faster with a better cosmetic result if it is kept moist with ointment and covered with a bandage.    **Do not let the wound dry out.**      Supplies Needed:      *Cotton tipped applicators (Q-tips)    *Polysporin Ointment or Bacitracin Ointment (NOT NEOSPORIN)    *Band-aids or non-stick gauze pads and micropore paper tape.      PATIENT INFORMATION:    During the healing process you will notice a number of changes. All wounds develop a small halo of redness surrounding the wound.  This means healing is occurring. Severe itching with extensive redness usually indicates sensitivity to the ointment or bandage tape used to dress the wound.  You should call our office if this develops.      Swelling  and/or discoloration around your surgical site is common, particularly when performed around the eye.    All wounds normally drain.  The larger the wound the more drainage there will be.  After 7-10 days, you will notice the wound beginning to shrink and new skin will begin to grow.  The wound is healed when you can see skin has formed over the entire area.  A healed wound has a healthy, shiny look to the surface and is red to dark pink in color  to normalize.  Wounds may take approximately 4-6 weeks to heal.  Larger wounds may take 6-8 weeks.  After the wound is healed you may discontinue dressing changes.    You may experience a sensation of tightness as your wound heals. This is normal and will gradually subside.    Your healed wound may be sensitive to temperature changes. This sensitivity improves with time, but if you re having a lot of discomfort, try to avoid temperature extremes.    Patients frequently experience itching after their wound appears to have healed because of the continue healing under the skin.  Plain Vaseline will help relieve the itching.        POSSIBLE COMPLICATIONS    BLEEDIN. Leave the bandage in place.  2. Use tightly rolled up gauze or a cloth to apply direct pressure over the bandage for 30  minutes.  3. Reapply pressure for an additional 30 minutes if necessary  4. Use additional gauze and tape to maintain pressure once the bleeding has stopped.      WOUND CARE INSTRUCTIONS   FOR CRYOSURGERY   This area treated with liquid nitrogen will form a blister. You do not need to bandage the area until after the blister forms and breaks (which may be a few days). When the blister breaks, begin daily dressing changes as follows:   1) Clean and dry the area with tap water using clean Q-tip or sterile gauze pad.   2) Apply Polysporin ointment or Bacitracin ointment over entire wound. Do NOT use Neosporin ointment.   3) Cover the wound with a band-aid or sterile non-stick gauze pad and micropore paper tape.   REPEAT THESE INSTRUCTIONS AT LEAST ONCE A DAY UNTIL THE WOUND HAS COMPLETELY HEALED.   It is an old wives tale that a wound heals better when it is exposed to air and allowed to dry out. The wound will heal faster with a better cosmetic result if it is kept moist with ointment and covered with a bandage.   Do not let the wound dry out.   IMPORTANT INFORMATION ON REVERSE SIDE   Supplies Needed:   *Cotton tipped applicators  (Q-tips)   *Polysporin ointment or Bacitracin ointment (NOT NEOSPORIN)   *Band-aids, or non stick gauze pads and micropore paper tape   PATIENT INFORMATION   During the healing process you will notice a number of changes. All wounds develop a small halo of redness surrounding the wound. This means healing is occurring. Severe itching with extensive redness usually indicates sensitivity to the ointment or bandage tape used to dress the wound. You should call our office if this develops.   Swelling and/or discoloration around your surgical site is common, particularly when performed around the eye.   All wounds normally drain. The larger the wound the more drainage there will be. After 7-10 days, you will notice the wound beginning to shrink and new skin will begin to grow. The wound is healed when you can see skin has formed over the entire area. A healed wound has a healthy, shiny look to the surface and is red to dark pink in color to normalize. Wounds may take approximately 4-6 weeks to heal. Larger wounds may take 6-8 weeks. After the wound is healed you may discontinue dressing changes.   You may experience a sensation of tightness as your wound heals. This is normal and will gradually subside.   Your healed wound may be sensitive to temperature changes. This sensitivity improves with time, but if you re having a lot of discomfort, try to avoid temperature extremes.   Patients frequently experience itching after their wound appears to have healed because of the continue healing under the skin. Plain Vaseline will help relieve the itching.

## 2017-09-13 NOTE — MR AVS SNAPSHOT
After Visit Summary   9/13/2017    Narinder Edmonds    MRN: [id number]           Patient Information     Date Of Birth          1950        Visit Information        Provider Department      9/13/2017 9:45 AM Ike Edmonds MD [facility]        Care Instructions          Wound Care Instructions     FOR SUPERFICIAL WOUNDS     [facility] [phone]    [facility] [phone]                       AFTER 24 HOURS YOU SHOULD REMOVE THE BANDAGE AND BEGIN DAILY DRESSING CHANGES AS FOLLOWS:     1) Remove Dressing.     2) Clean and dry the area with tap water using a Q-tip or sterile gauze pad.     3) Apply Vaseline, Aquaphor, Polysporin ointment or Bacitracin ointment over entire wound.  Do NOT use Neosporin ointment.     4) Cover the wound with a band-aid, or a sterile non-stick gauze pad and micropore paper tape      REPEAT THESE INSTRUCTIONS AT LEAST ONCE A DAY UNTIL THE WOUND HAS COMPLETELY HEALED.    It is an old wives tale that a wound heals better when it is exposed to air and allowed to dry out. The wound will heal faster with a better cosmetic result if it is kept moist with ointment and covered with a bandage.    **Do not let the wound dry out.**      Supplies Needed:      *Cotton tipped applicators (Q-tips)    *Polysporin Ointment or Bacitracin Ointment (NOT NEOSPORIN)    *Band-aids or non-stick gauze pads and micropore paper tape.      PATIENT INFORMATION:    During the healing process you will notice a number of changes. All wounds develop a small halo of redness surrounding the wound.  This means healing is occurring. Severe itching with extensive redness usually indicates sensitivity to the ointment or bandage tape used to dress the wound.  You should call our office if this develops.      Swelling  and/or discoloration around your surgical site is common, particularly when performed around the eye.    All wounds normally drain.  The  larger the wound the more drainage there will be.  After 7-10 days, you will notice the wound beginning to shrink and new skin will begin to grow.  The wound is healed when you can see skin has formed over the entire area.  A healed wound has a healthy, shiny look to the surface and is red to dark pink in color to normalize.  Wounds may take approximately 4-6 weeks to heal.  Larger wounds may take 6-8 weeks.  After the wound is healed you may discontinue dressing changes.    You may experience a sensation of tightness as your wound heals. This is normal and will gradually subside.    Your healed wound may be sensitive to temperature changes. This sensitivity improves with time, but if you re having a lot of discomfort, try to avoid temperature extremes.    Patients frequently experience itching after their wound appears to have healed because of the continue healing under the skin.  Plain Vaseline will help relieve the itching.        POSSIBLE COMPLICATIONS    BLEEDIN. Leave the bandage in place.  2. Use tightly rolled up gauze or a cloth to apply direct pressure over the bandage for 30  minutes.  3. Reapply pressure for an additional 30 minutes if necessary  4. Use additional gauze and tape to maintain pressure once the bleeding has stopped.      WOUND CARE INSTRUCTIONS   FOR CRYOSURGERY   This area treated with liquid nitrogen will form a blister. You do not need to bandage the area until after the blister forms and breaks (which may be a few days). When the blister breaks, begin daily dressing changes as follows:   1) Clean and dry the area with tap water using clean Q-tip or sterile gauze pad.   2) Apply Polysporin ointment or Bacitracin ointment over entire wound. Do NOT use Neosporin ointment.   3) Cover the wound with a band-aid or sterile non-stick gauze pad and micropore paper tape.   REPEAT THESE INSTRUCTIONS AT LEAST ONCE A DAY UNTIL THE WOUND HAS COMPLETELY HEALED.   It is an old wives tale that a  wound heals better when it is exposed to air and allowed to dry out. The wound will heal faster with a better cosmetic result if it is kept moist with ointment and covered with a bandage.   Do not let the wound dry out.   IMPORTANT INFORMATION ON REVERSE SIDE   Supplies Needed:   *Cotton tipped applicators (Q-tips)   *Polysporin ointment or Bacitracin ointment (NOT NEOSPORIN)   *Band-aids, or non stick gauze pads and micropore paper tape   PATIENT INFORMATION   During the healing process you will notice a number of changes. All wounds develop a small halo of redness surrounding the wound. This means healing is occurring. Severe itching with extensive redness usually indicates sensitivity to the ointment or bandage tape used to dress the wound. You should call our office if this develops.   Swelling and/or discoloration around your surgical site is common, particularly when performed around the eye.   All wounds normally drain. The larger the wound the more drainage there will be. After 7-10 days, you will notice the wound beginning to shrink and new skin will begin to grow. The wound is healed when you can see skin has formed over the entire area. A healed wound has a healthy, shiny look to the surface and is red to dark pink in color to normalize. Wounds may take approximately 4-6 weeks to heal. Larger wounds may take 6-8 weeks. After the wound is healed you may discontinue dressing changes.   You may experience a sensation of tightness as your wound heals. This is normal and will gradually subside.   Your healed wound may be sensitive to temperature changes. This sensitivity improves with time, but if you re having a lot of discomfort, try to avoid temperature extremes.   Patients frequently experience itching after their wound appears to have healed because of the continue healing under the skin. Plain Vaseline will help relieve the itching.                 Follow-ups after your visit        Your next 10  "appointments already scheduled     Sep 25, 2017  3:30 PM CDT   SLEEP DME MASK FITTING with [facility] DME   [facility] ([facility])    [address]  [place] [address]   [phone]            Sep 25, 2017  4:00 PM CDT   Oximetry  with CL [facility] DME   [facility] ([facility])    [address]  [place] [address]   [phone]            Sep 26, 2017  8:00 AM CDT   Oximetry Drop Off with CL [facility] DME   [facility] ([facility])    [address]  [place] [address]   [phone]              Future tests that were ordered for you today     Open Future Orders        Priority Expected Expires Ordered    Overnight oximetry study Routine  3/11/2018 9/12/2017            Who to contact     If you have questions or need follow up information about today's clinic visit or your schedule please contact [facility] directly at [phone].  Normal or non-critical lab and imaging results will be communicated to you by [employer], letter or phone within 4 business days after the clinic has received the results. If you do not hear from us within 7 days, please contact the clinic through [employer] or phone. If you have a critical or abnormal lab result, we will notify you by phone as soon as possible.  Submit refill requests through [place] or call your pharmacy and they will forward the refill request to us. Please allow 3 business days for your refill to be completed.          Additional Information About Your Visit        MyChart Information     [place] lets you send messages to your doctor, view your test results, renew your prescriptions, schedule appointments and more. To sign up, go to [email or web address] . Click on \"Log in\" on the left side of the screen, which will take you to the Welcome page. Then click on \"Sign up Now\" on the right side of the page. "     You will be asked to enter the access code listed below, as well as some personal information. Please follow the directions to create your username and password.     Your access code is: [email or web address]  Expires: 2017  3:51 PM     Your access code will  in 90 days. If you need help or a new code, please call your [facility] clinic or [phone].        Care EveryWhere ID     This is your Care EveryWhere ID. This could be used by other organizations to access your [facility] medical records  [id number]        Your Vitals Were     Pulse Pulse Oximetry                74 98%           Blood Pressure from Last 3 Encounters:   17 143/71   17 118/60   17 132/80    Weight from Last 3 Encounters:   17 92.1 kg (203 lb)   17 95.3 kg (210 lb)   12 90.7 kg (200 lb)              Today, you had the following     No orders found for display       Primary Care Provider Office Phone # Fax #    Jeff Beltran [phone]       [facility] [address] [address]        Equal Access to Services     DAV YOUNG AH: Hadii aad ku hadasho Sojosephali, waaxda luqadaha, qaybta kaalmada adeegyada, taj novoa. So [facility] [phone].    ATENCIÓN: Si habla español, tiene a ferris disposición servicios gratuitos de asistencia lingüística. [facility] [phone].    We comply with applicable federal civil rights laws and [place] laws. We do not discriminate on the basis of race, color, national origin, age, disability sex, sexual orientation or gender identity.            Thank you!     Thank you for choosing [facility]  for your care. Our goal is always to provide you with excellent care. Hearing back from our patients is one way we can continue to improve our services. Please take a few minutes to complete the written survey that you may receive in the mail after your visit with us. Thank you!             Your Updated  Medication List - Protect others around you: Learn how to safely use, store and throw away your medicines at www.disposemymeds.org.          This list is accurate as of: 9/13/17 10:16 AM.  Always use your most recent med list.                   Brand Name Dispense Instructions for use Diagnosis    ACETAMINOPHEN PO      Take  by mouth.        * clobetasol propionate 0.05 % Sham      Externally apply  topically.        * Clobetasol 17 Propionate Powd           * clobetasol propionate 0.05 % Sham      AS PRESCRIBED        * Clobetasol Propionate Powd      AS PRESCRIBED        * DERMAZINC SPRAY EX      Externally apply  topically.        * DERMAZINC SPRAY 0.25 % Liqd   Generic drug:  Pyrithione Zinc      AS DIRECTED 1-2 TIMES DAILY        FELDENE 20 MG capsule   Generic drug:  piroxicam      1 CAPSULE ORALLY DAILY        FLEXERIL PO      Take  by mouth.        fluocinonide 0.05 % solution    LIDEX          fluticasone 27.5 MCG/SPRAY spray    VERAMYST     Spray 2 sprays in nostril daily.        metoprolol 50 MG tablet    LOPRESSOR     Take 50 mg by mouth        Multivitamin  /Iron Tabs      None Entered        PRILOSEC PO      Take  by mouth.        rivaroxaban ANTICOAGULANT 20 MG Tabs tablet    XARELTO     Take 20 mg by mouth        SYNTHROID PO      Take  by mouth.        tobramycin-dexamethasone 0.3-0.1 % ophthalmic susp    TOBRADEX     1 drop every 4 hours (while awake).        VITAMIN C PO      Take  by mouth.        * Notice:  This list has 6 medication(s) that are the same as other medications prescribed for you. Read the directions carefully, and ask your doctor or other care provider to review them with you.

## 2017-09-13 NOTE — NURSING NOTE
Chief Complaint   Patient presents with     Skin Check     Psoriasis       Vitals:    09/13/17 0948   BP: 143/71   Pulse: 74   SpO2: 98%     Wt Readings from Last 1 Encounters:   09/12/17 92.1 kg (203 lb)       Polina Deluca LPN.................9/13/2017

## 2017-09-14 NOTE — TELEPHONE ENCOUNTER
Pt notified of below.  Pt reports understanding.  Pt does not have further questions or concerns.    Patient transferred to scheduling for appointments.    Lori Boo   [facility]  [facility]

## 2017-09-14 NOTE — TELEPHONE ENCOUNTER
Left message for pt to call back for results. He will need two mohs appts.  Millicent JUÁREZ RN BSN PHN  [facility] Clinics

## 2017-09-25 ENCOUNTER — DOCUMENTATION ONLY (OUTPATIENT)
Dept: SLEEP MEDICINE | Facility: CLINIC | Age: 67
End: 2017-09-25

## 2017-09-25 ENCOUNTER — OFFICE VISIT (OUTPATIENT)
Dept: SLEEP MEDICINE | Facility: CLINIC | Age: 67
End: 2017-09-25

## 2017-09-25 DIAGNOSIS — G47.33 OSA (OBSTRUCTIVE SLEEP APNEA): ICD-10-CM

## 2017-09-25 NOTE — MR AVS SNAPSHOT
After Visit Summary   9/25/2017    Narinder Edmonds    MRN: [id number]           Patient Information     Date Of Birth          1950        Visit Information        Provider Department      9/25/2017 4:00 PM SLEEP LAB, BED FIVE [facility]        Today's Diagnoses     GISELLE (obstructive sleep apnea)           Follow-ups after your visit        Your next 10 appointments already scheduled     Sep 26, 2017  8:00 AM CDT   Oximetry Drop Off with SLEEP LAB, BED FIVE   [facility] [place]    [address]  [facility] [address]   [phone]            Oct 11, 2017  7:30 AM CDT   MOHS with Ike Edmonds MD   [facility]    [address] [address]   [phone]            Oct 23, 2017  7:30 AM CDT   MOHS with Ike Edmonds MD   [facility] ([facility]    [address] [address]   [phone]              Who to contact     If you have questions or need follow up information about today's clinic visit or your schedule please contact [facility] directly at [phone].  Normal or non-critical lab and imaging results will be communicated to you by MyChart, letter or phone within 4 business days after the clinic has received the results. If you do not hear from us within 7 days, please contact the clinic through MyChart or phone. If you have a critical or abnormal lab result, we will notify you by phone as soon as possible.  Submit refill requests through Ambric or call your pharmacy and they will forward the refill request to us. Please allow 3 business days for your refill to be completed.          Additional Information About Your Visit        Ambric Information     Ambric lets you send messages to your doctor, view your test results, renew your prescriptions, schedule  "appointments and more. To sign up, go to [email or web address] . Click on \"Log in\" on the left side of the screen, which will take you to the Welcome page. Then click on \"Sign up Now\" on the right side of the page.     You will be asked to enter the access code listed below, as well as some personal information. Please follow the directions to create your username and password.     Your access code is: [email or web address]  Expires: 2017  3:51 PM     Your access code will  in 90 days. If you need help or a new code, please call your [place] clinic or [phone].        Care EveryWhere ID     This is your Care EveryWhere ID. This could be used by other organizations to access your [place] medical records  [id number]         Blood Pressure from Last 3 Encounters:   17 143/71   17 118/60   17 132/80    Weight from Last 3 Encounters:   17 92.1 kg (203 lb)   17 95.3 kg (210 lb)   12 90.7 kg (200 lb)              We Performed the Following     Overnight oximetry study        Primary Care Provider Office Phone # Fax #    Jeff Alex Beltran [phone]       [facility] [address] [address]        Equal Access to Services     DAV YOUNG AH: Hadii aleah wesley hadasho Sooctavio, waaxda luqadaha, qaybta kaalmada aderenyada, taj nelson . So [facility] [phone].    ATENCIÓN: Si habla español, tiene a ferris disposición servicios gratuitos de asistencia lingüística. Esteban al [phone].    We comply with applicable federal civil rights laws and [place] laws. We do not discriminate on the basis of race, color, national origin, age, disability sex, sexual orientation or gender identity.            Thank you!     Thank you for choosing [facility]  for your care. Our goal is always to provide you with excellent care. Hearing back from our patients is one way we can continue to improve our services. Please " take a few minutes to complete the written survey that you may receive in the mail after your visit with us. Thank you!             Your Updated Medication List - Protect others around you: Learn how to safely use, store and throw away your medicines at www.disposemymeds.org.          This list is accurate as of: 9/25/17  4:18 PM.  Always use your most recent med list.                   Brand Name Dispense Instructions for use Diagnosis    ACETAMINOPHEN PO      Take  by mouth.        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        * Clobetasol 17 Propionate Powd           * clobetasol propionate 0.05 % Sham      AS PRESCRIBED        * Clobetasol Propionate Powd      AS PRESCRIBED        * clobetasol 0.05 % external solution    TEMOVATE    100 mL    Apply sparingly to affected area twice daily on scalp. Do not apply to face.    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        * DERMAZINC SPRAY 0.25 % Liqd   Generic drug:  Pyrithione Zinc      AS DIRECTED 1-2 TIMES DAILY        FELDENE 20 MG capsule   Generic drug:  piroxicam      1 CAPSULE ORALLY DAILY        FLEXERIL PO      Take  by mouth.        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        metoprolol 50 MG tablet    LOPRESSOR     Take  50 mg by mouth        Multivitamin  /Iron Tabs      None Entered        PRILOSEC PO      Take  by mouth.        rivaroxaban ANTICOAGULANT 20 MG Tabs tablet    XARELTO     Take 20 mg by mouth        SYNTHROID PO      Take  by mouth.        tobramycin-dexamethasone 0.3-0.1 % ophthalmic susp    TOBRADEX     1 drop every 4 hours (while awake).        VITAMIN C PO      Take  by mouth.        * Notice:  This list has 7 medication(s) that are the same as other medications prescribed for you. Read the directions carefully, and ask your doctor or other care provider to review them with you.

## 2017-09-25 NOTE — PROGRESS NOTES
SUBJECTIVE:  Chief Complaint   Patient presents with     Sleep Study      overnight oximeter         OBJECTIVE:  overnight pulse oximetry ordered.    Instructions were reviewed with Narinder and were also printed for Narinder to take with him.   Narinder verbalized understanding and demonstrated use very well.     ASSESSMENT/PLAN:  Narinder received overnight pulse oximetry today September 25, 2017. Pt will use device and will return on 9/26/17

## 2017-09-25 NOTE — PROGRESS NOTES
Patient came to [facility] for mask fitting appointment on September 25, 2017. Patient requested to switch masks because poor seal/leak.  Patient tried on the followings masks: AIRFIT F20 MEDIUM, SIMPLUS MEDIUM, AIRFIT N20 LARGE   Patient selected  Resmed, type AIRFIT N20 Nasal mask Large.

## 2017-09-26 ENCOUNTER — DOCUMENTATION ONLY (OUTPATIENT)
Dept: SLEEP MEDICINE | Facility: CLINIC | Age: 67
End: 2017-09-26

## 2017-09-26 NOTE — PROGRESS NOTES
Oximetry results were obtained for the date of 9/25/2017.  The patient was on a treatment of CPAP 9-15 cm. The study showed a valid recording time of 7 hours and 19 minutes with a 4% desaturation index of 4.9.  The basal oxygen saturation was 94.1% and the lowest SpO2 was 87-88%.  The patient spent 0.3 minutes below 88%. No significant hypoxemia on current CPAP treatment. Continue current PAP therapy and follow up as planned.   Jaclyn Gray PA-C

## 2017-09-26 NOTE — PROGRESS NOTES
Spoke with patient and gave him normal oximetry results per Jaclyn Gray PA-C. Patient to call if any questions or concerns arise.

## 2017-10-11 ENCOUNTER — OFFICE VISIT (OUTPATIENT)
Dept: DERMATOLOGY | Facility: CLINIC | Age: 67
End: 2017-10-11
Payer: COMMERCIAL

## 2017-10-11 VITALS — HEART RATE: 66 BPM | DIASTOLIC BLOOD PRESSURE: 67 MMHG | HEIGHT: 71 IN | SYSTOLIC BLOOD PRESSURE: 136 MMHG

## 2017-10-11 DIAGNOSIS — C44.329 SQUAMOUS CELL CARCINOMA OF SKIN OF TEMPLE REGION: ICD-10-CM

## 2017-10-11 DIAGNOSIS — C76.1 PRIMARY SQUAMOUS CELL CARCINOMA OF CHEST WALL (H): Primary | ICD-10-CM

## 2017-10-11 PROCEDURE — 17313 MOHS 1 STAGE T/A/L: CPT | Performed by: DERMATOLOGY

## 2017-10-11 PROCEDURE — 17311 MOHS 1 STAGE H/N/HF/G: CPT | Performed by: DERMATOLOGY

## 2017-10-11 NOTE — MR AVS SNAPSHOT
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 No strenuous activity for 48 hours    ? Take Tylenol as needed for discomfort.                                                .         ? Do not drink alcoholic beverages for 48 hours.    ? Keep the pressure bandage in place for 24 hours. If the bandage becomes blood tinged or loose, reinforce it with gauze and tape.        (Refer to the reverse side of this page for management of bleeding).    ? Remove bandage in 24 hours and begin wound care as follows:     1. Clean area with tap water using a Q tip or gauze pad, (shower / bathe normally)  2. Dry wound with Q tip or gauze pad  3. Apply Aquaphor, Vaseline, Polysporin or Bacitracin Ointment with a Q tip    Do NOT use Neosporin Ointment *  4. Cover the wound with a band-aid or nonstick gauze pad and paper tape.  5. Repeat wound care once a day until wound is completely healed.    It is an old wives tale that a wound heals better when it is exposed to air and allowed to dry out. The wound will heal faster with a better cosmetic result if it is kept moist with ointment and covered with a bandage.  Do not let the wound dry out.      Supplies Needed:                Qtips or gauze pads                Polysporin or Bacitracin Ointment                Bandaids or nonstick gauze pads and paper tape    Wound care kits and brown paper tape are available for purchase at   the pharmacy.    BLEEDIN. Use tightly rolled up gauze or cloth to apply direct pressure over the bandage for 20    minutes.  2. Reapply pressure for an additional 20 minutes if necessary  3. Call the office or go to the nearest emergency room if pressure fails to stop the bleeding.  4. Use additional gauze and tape to maintain pressure once the bleeding has stopped.  5. Begin wound care 24 hours after surgery as directed.                  WOUND HEALING    1. One week after surgery a pink / red halo will form around the outside of the wound.   This is new skin.  2. The center of the wound will appear yellowish white and produce some drainage.  3. The pink halo will slowly migrate in toward the center of the wound until the wound is covered with new shiny pink skin.  4. There will be no more drainage when the wound is completely healed.  5. It will take six months to one year for the redness to fade.  6. The scar may be itchy, tight and sensitive to extreme temperatures for a year after the surgery.  7. Massaging the area several times a day for several minutes after the wound is completely healed will help the scar soften and normalize faster. Begin massage only after healing is complete.      In case of emergency call: Dr Edmonds: [phone]     [facility]: [phone]    [facility]: [phone]            Follow-ups after your visit        Your next 10 appointments already scheduled     Oct 23, 2017  7:30 AM CDT   MOHS with Ike Edmonds MD   [facility]    [address] [address] [phone]              Who to contact     If you have questions or need follow up information about today's clinic visit or your schedule please contact [facility] directly at [phone].  Normal or non-critical lab and imaging results will be communicated to you by MyChart, letter or phone within 4 business days after the clinic has received the results. If you do not hear from us within 7 days, please contact the clinic through  "Mod[employer] Mediahart or phone. If you have a critical or abnormal lab result, we will notify you by phone as soon as possible.  Submit refill requests through Nanomech or call your pharmacy and they will forward the refill request to us. Please allow 3 business days for your refill to be completed.          Additional Information About Your Visit        Modiv MediaharClue App Information     Nanomech lets you send messages to your doctor, view your test results, renew your prescriptions, schedule appointments and more. To sign up, go to [email or web address] . Click on \"Log in\" on the left side of the screen, which will take you to the Welcome page. Then click on \"Sign up Now\" on the right side of the page.     You will be asked to enter the access code listed below, as well as some personal information. Please follow the directions to create your username and password.     Your access code is: [email or web address]  Expires: 2017  3:51 PM     Your access code will  in 90 days. If you need help or a new code, please call your [place] clinic or [phone].        Care EveryWhere ID     This is your Care EveryWhere ID. This could be used by other organizations to access your [place] medical records  [id number]        Your Vitals Were     Pulse Height                66 1.791 m (5' 10.5\")           Blood Pressure from Last 3 Encounters:   10/11/17 136/67   17 143/71   17 118/60    Weight from Last 3 Encounters:   17 92.1 kg (203 lb)   17 95.3 kg (210 lb)   12 90.7 kg (200 lb)              We Performed the Following     MOHS HEAD/NCK/HND/FT/GEN 1ST STAGE UP T0 5 BLOCKS     MOHS TRUNK/ARM/LEG 1ST STAGE UP T0 5 BLOCKS        Primary Care Provider Office Phone # Fax #    Jeff Beltran [phone]       [place] [address] [address]        Equal Access to Services     SUKHJINDER YOUNG AH: Hadii aleah Mustafa, trey perez, qanyasiata kataj arias " alfredo khaned la'aan ah. So [facility] [phone].    ATENCIÓN: Si nadege noonan, tiene a ferris disposición servicios gratuitos de asistencia lingüística. Esteban al [phone].    We comply with applicable federal civil rights laws and [place] laws. We do not discriminate on the basis of race, color, national origin, age, disability, sex, sexual orientation, or gender identity.            Thank you!     Thank you for choosing [facility]  for your care. Our goal is always to provide you with excellent care. Hearing back from our patients is one way we can continue to improve our services. Please take a few minutes to complete the written survey that you may receive in the mail after your visit with us. Thank you!             Your Updated Medication List - Protect others around you: Learn how to safely use, store and throw away your medicines at www.disposemymeds.org.          This list is accurate as of: 10/11/17  9:31 AM.  Always use your most recent med list.                   Brand Name Dispense Instructions for use Diagnosis    ACETAMINOPHEN PO      Take  by mouth.        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        * Clobetasol 17 Propionate Powd           * clobetasol propionate 0.05 % Sham      AS PRESCRIBED        * Clobetasol Propionate Powd      AS PRESCRIBED        * clobetasol 0.05 % external solution    TEMOVATE    100 mL    Apply sparingly to affected area twice daily on scalp. Do not apply to face.    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        * DERMAZINC SPRAY 0.25 % Liqd   Generic drug:  Pyrithione Zinc      AS DIRECTED 1-2 TIMES DAILY        FELDENE 20 MG capsule   Generic drug:  piroxicam      1 CAPSULE ORALLY DAILY        FLEXERIL PO      Take  by mouth.        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        metoprolol 50 MG tablet    LOPRESSOR     Take 50 mg by mouth        Multivitamin  /Iron Tabs      None Entered        PRILOSEC PO      Take  by mouth.        rivaroxaban ANTICOAGULANT 20 MG Tabs tablet    XARELTO     Take 20 mg by mouth        SYNTHROID PO      Take  by mouth.        tobramycin-dexamethasone 0.3-0.1 % ophthalmic susp    TOBRADEX     1 drop every 4 hours (while awake).        VITAMIN C PO      Take  by mouth.        * Notice:  This list has 7 medication(s) that are the same as other medications prescribed for you. Read the directions carefully, and ask your doctor or other care provider to review them with you.

## 2017-10-11 NOTE — PATIENT INSTRUCTIONS
Open Wound Care     for Chest         ? No strenuous activity for 48 hours    ? Take Tylenol as needed for discomfort.                                                .         ? Do not drink alcoholic beverages for 48 hours.    ? Keep the pressure bandage in place for 24 hours. If the bandage becomes blood tinged or loose, reinforce it with gauze and tape.        (Refer to the reverse side of this page for management of bleeding).    ? Remove bandage in 24 hours and begin wound care as follows:     1. Clean area with tap water using a Q tip or gauze pad, (shower / bathe normally)  2. Dry wound with Q tip or gauze pad  3. Apply Aquaphor, Vaseline, Polysporin or Bacitracin Ointment with a Q tip    Do NOT use Neosporin Ointment *  4. Cover the wound with a band-aid or nonstick gauze pad and paper tape.  5. Repeat wound care once a day until wound is completely healed.    It is an old wives tale that a wound heals better when it is exposed to air and allowed to dry out. The wound will heal faster with a better cosmetic result if it is kept moist with ointment and covered with a bandage.  Do not let the wound dry out.      Supplies Needed:                Qtips or gauze pads                Polysporin or Bacitracin Ointment                Bandaids or nonstick gauze pads and paper tape    Wound care kits and brown paper tape are available for purchase at   the pharmacy.    BLEEDIN. Use tightly rolled up gauze or cloth to apply direct pressure over the bandage for 20   minutes.  2. Reapply pressure for an additional 20 minutes if necessary  3. Call the office or go to the nearest emergency room if pressure fails to stop the bleeding.  4. Use additional gauze and tape to maintain pressure once the bleeding has stopped.  5. Begin wound care 24 hours after surgery as directed.                  WOUND HEALING    1. One week after surgery a pink / red halo will form around the outside of the wound.   This is new  skin.  2. The center of the wound will appear yellowish white and produce some drainage.  3. The pink halo will slowly migrate in toward the center of the wound until the wound is covered with new shiny pink skin.  4. There will be no more drainage when the wound is completely healed.  5. It will take six months to one year for the redness to fade.  6. The scar may be itchy, tight and sensitive to extreme temperatures for a year after the surgery.  7. Massaging the area several times a day for several minutes after the wound is completely healed will help the scar soften and normalize faster. Begin massage only after healing is complete.      In case of emergency call: Dr Edmonds: [phone]     [facility]: [phone]    [facility]: [phone]

## 2017-10-11 NOTE — LETTER
10/11/2017         RE: Narinder Edmonds  [id number] [facility] [address]        Dear Colleague,    Thank you for referring your patient, Narinder Edmonds, to the [facility]. Please see a copy of my visit note below.    Narinder Edmonds is a 67 year old year old male patient here today for e of squamous cell carcinoma. Patient reports the following modifying factors none.  Associated symptoms: none.  Patient has no other skin complaints today.  Remainder of the HPI, Meds, PMH, Allergies, FH, and SH was reviewed in chart.    Pertinent Hx:   Non-melanoma skin cancer   Past Medical History:   Diagnosis Date     Squamous cell carcinoma        Past Surgical History:   Procedure Laterality Date     ESOPHAGOSCOPY, GASTROSCOPY, DUODENOSCOPY (EGD), COMBINED  4/9/2012    Procedure:COMBINED ESOPHAGOSCOPY, GASTROSCOPY, DUODENOSCOPY (EGD); Gastroscopy; Surgeon:DELMAR CAMPBELL; Location:[facility]        History reviewed. No pertinent family history.    Social History     Social History     Marital status:      Spouse name: N/A     Number of children: N/A     Years of education: N/A     Occupational History     Not on file.     Social History Main Topics     Smoking status: Never Smoker     Smokeless tobacco: Never Used     Alcohol use Not on file     Drug use: Not on file     Sexual activity: Not on file     Other Topics Concern     Not on file     Social History Narrative       Outpatient Encounter Prescriptions as of 10/11/2017   Medication Sig Dispense Refill     fluocinonide (LIDEX) 0.05 % solution        clobetasol (TEMOVATE) 0.05 % external solution Apply sparingly to affected area twice daily on scalp. Do not apply to face. 100 mL 3     ciclopirox (LOPROX) 0.77 % cream Apply topically 2 times daily 90 g 3     metoprolol (LOPRESSOR) 50 MG tablet Take 50 mg by mouth       rivaroxaban ANTICOAGULANT (XARELTO) 20 MG TABS tablet Take 20 mg by mouth       ACETAMINOPHEN PO Take  by mouth.        "Ascorbic Acid (VITAMIN C PO) Take  by mouth.       clobetasol propionate 0.05 % SHAM Externally apply  topically.       Clobetasol Propionate (CLOBETASOL 17 PROPIONATE) POWD        Cyclobenzaprine HCl (FLEXERIL PO) Take  by mouth.       fluticasone (VERAMYST) 27.5 MCG/SPRAY nasal spray Spray 2 sprays in nostril daily.       Levothyroxine Sodium (SYNTHROID PO) Take  by mouth.       Omeprazole (PRILOSEC PO) Take  by mouth.       Pyrithione Zinc (DERMAZINC SPRAY EX) Externally apply  topically.       tobramycin-dexamethasone (TOBRADEX) ophthalmic solution 1 drop every 4 hours (while awake).       CLOBETASOL PROPIONATE 0.05 % EX SHAM AS PRESCRIBED       CLOBETASOL PROPIONATE POWD AS PRESCRIBED       FELDENE 20 MG OR CAPS 1 CAPSULE ORALLY DAILY       DERMAZINC SPRAY 0.25 % EX LIQD AS DIRECTED 1-2 TIMES DAILY       MULTIVITAMIN/IRON OR TABS None Entered       No facility-administered encounter medications on file as of 10/11/2017.              Review Of Systems  Skin: As above  Eyes: negative  Ears/Nose/Throat: negative  Respiratory: No shortness of breath, dyspnea on exertion, cough, or hemoptysis  Cardiovascular: negative  Gastrointestinal: negative  Genitourinary: negative  Musculoskeletal: negative  Neurologic: negative  Psychiatric: negative  Hematologic/Lymphatic/Immunologic: negative  Endocrine: negative      O:   NAD, WDWN, Alert & Oriented, Mood & Affect wnl, Vitals stable   Here today alone   /67  Pulse 66  Ht 1.791 m (5' 10.5\")   General appearance normal   Vitals stable   Alert, oriented and in no acute distress      Following lymph nodes palpated: Occipital, Cervical, Supraclavicular no lad   L chest 1cm scaly papule    R temple 6mm scaly papule         Eyes: Conjunctivae/lids:Normal     ENT: Lips, buccal mucosa, tongue: normal    MSK:Normal    Cardiovascular: peripheral edema none    Pulm: Breathing Normal    Lymph Nodes: No Head and Neck Lymphadenopathy     Neuro/Psych: Orientation:Normal; " Mood/Affect:Normal      A/P:  1. L chest squamous cell carcinoma   MOHS:   Aggressive histology    After PGACAC discussed with patient, decision for Mohs surgery was made. Indication for Mohs was Aggressive histology. Patient confirmed the site with Dr. Edmonds.  After anesthesia with LEC, the tumor was excised using standard Mohs technique in 1 stages(s).  CLEAR MARGINS OBTAINED and Final defect size was 1.5cm.       REPAIR SECOND INTENT: We discussed the options for wound management in full with the patient including risks/benefits/possible outcomes. Decision made to allow the wound to heal by second intention. EBL minimal; complications none; wound care routine.  The patient was discharged in good condition and will return in one month or prn for wound evaluation.    2. R temple squamous cell carcinoma   MOHS:   Location    After PGACAC discussed with patient, decision for Mohs surgery was made. Indication for Mohs was Location. Patient confirmed the site with Dr. Edmonds.  After anesthesia with LEC, the tumor was excised using standard Mohs technique in 1 stages(s).  CLEAR MARGINS OBTAINED and Final defect size was 1.1 cm.       REPAIR SECOND INTENT: We discussed the options for wound management in full with the patient including risks/benefits/possible outcomes. Decision made to allow the wound to heal by second intention. EBL minimal; complications none; wound care routine.  The patient was discharged in good condition and will return in one month or prn for wound evaluation.    BENIGN LESIONS DISCUSSED WITH PATIENT:  I discussed the specifics of tumor, prognosis, and genetics of benign lesions.  I explained that treatment of these lesions would be purely cosmetic and not medically neccessary.  I discussed with patient different removal options including excision, cautery and /or laser.      Nature and genetics of benign skin lesions dicussed with patient.  Signs and Symptoms of skin cancer discussed with  patient.  Patient encouraged to perform monthly skin exams.  UV precautions reviewed with patient.  Skin care regimen reviewed with patient: Eliminate harsh soaps, i.e. Dial, zest, irsih spring; Mild soaps such as Cetaphil or Dove sensitive skin, avoid hot or cold showers, aggressive use of emollients including vanicream, cetaphil or cerave discussed with patient.    Risks of non-melanoma skin cancer discussed with patient   Return to clinic 6 months      Again, thank you for allowing me to participate in the care of your patient.        Sincerely,        Ike Edmonds MD

## 2017-10-11 NOTE — PROGRESS NOTES
Narinder Edmonds is a 67 year old year old male patient here today for e of squamous cell carcinoma. Patient reports the following modifying factors none.  Associated symptoms: none.  Patient has no other skin complaints today.  Remainder of the HPI, Meds, PMH, Allergies, FH, and SH was reviewed in chart.    Pertinent Hx:   Non-melanoma skin cancer   Past Medical History:   Diagnosis Date     Squamous cell carcinoma        Past Surgical History:   Procedure Laterality Date     ESOPHAGOSCOPY, GASTROSCOPY, DUODENOSCOPY (EGD), COMBINED  4/9/2012    Procedure:COMBINED ESOPHAGOSCOPY, GASTROSCOPY, DUODENOSCOPY (EGD); Gastroscopy; Surgeon:DELMAR CAMPBELL; Location:[facility]        History reviewed. No pertinent family history.    Social History     Social History     Marital status:      Spouse name: N/A     Number of children: N/A     Years of education: N/A     Occupational History     Not on file.     Social History Main Topics     Smoking status: Never Smoker     Smokeless tobacco: Never Used     Alcohol use Not on file     Drug use: Not on file     Sexual activity: Not on file     Other Topics Concern     Not on file     Social History Narrative       Outpatient Encounter Prescriptions as of 10/11/2017   Medication Sig Dispense Refill     fluocinonide (LIDEX) 0.05 % solution        clobetasol (TEMOVATE) 0.05 % external solution Apply sparingly to affected area twice daily on scalp. Do not apply to face. 100 mL 3     ciclopirox (LOPROX) 0.77 % cream Apply topically 2 times daily 90 g 3     metoprolol (LOPRESSOR) 50 MG tablet Take 50 mg by mouth       rivaroxaban ANTICOAGULANT (XARELTO) 20 MG TABS tablet Take 20 mg by mouth       ACETAMINOPHEN PO Take  by mouth.       Ascorbic Acid (VITAMIN C PO) Take  by mouth.       clobetasol propionate 0.05 % SHAM Externally apply  topically.       Clobetasol Propionate (CLOBETASOL 17 PROPIONATE) POWD        Cyclobenzaprine HCl (FLEXERIL PO) Take  by mouth.       fluticasone  "(VERAMYST) 27.5 MCG/SPRAY nasal spray Spray 2 sprays in nostril daily.       Levothyroxine Sodium (SYNTHROID PO) Take  by mouth.       Omeprazole (PRILOSEC PO) Take  by mouth.       Pyrithione Zinc (DERMAZINC SPRAY EX) Externally apply  topically.       tobramycin-dexamethasone (TOBRADEX) ophthalmic solution 1 drop every 4 hours (while awake).       CLOBETASOL PROPIONATE 0.05 % EX SHAM AS PRESCRIBED       CLOBETASOL PROPIONATE POWD AS PRESCRIBED       FELDENE 20 MG OR CAPS 1 CAPSULE ORALLY DAILY       DERMAZINC SPRAY 0.25 % EX LIQD AS DIRECTED 1-2 TIMES DAILY       MULTIVITAMIN/IRON OR TABS None Entered       No facility-administered encounter medications on file as of 10/11/2017.              Review Of Systems  Skin: As above  Eyes: negative  Ears/Nose/Throat: negative  Respiratory: No shortness of breath, dyspnea on exertion, cough, or hemoptysis  Cardiovascular: negative  Gastrointestinal: negative  Genitourinary: negative  Musculoskeletal: negative  Neurologic: negative  Psychiatric: negative  Hematologic/Lymphatic/Immunologic: negative  Endocrine: negative      O:   NAD, WDWN, Alert & Oriented, Mood & Affect wnl, Vitals stable   Here today alone   /67  Pulse 66  Ht 1.791 m (5' 10.5\")   General appearance normal   Vitals stable   Alert, oriented and in no acute distress      Following lymph nodes palpated: Occipital, Cervical, Supraclavicular no lad   L chest 1cm scaly papule    R temple 6mm scaly papule         Eyes: Conjunctivae/lids:Normal     ENT: Lips, buccal mucosa, tongue: normal    MSK:Normal    Cardiovascular: peripheral edema none    Pulm: Breathing Normal    Lymph Nodes: No Head and Neck Lymphadenopathy     Neuro/Psych: Orientation:Normal; Mood/Affect:Normal      A/P:  1. L chest squamous cell carcinoma   MOHS:   Aggressive histology    After PGACAC discussed with patient, decision for Mohs surgery was made. Indication for Mohs was Aggressive histology. Patient confirmed the site with " Grey.  After anesthesia with LEC, the tumor was excised using standard Mohs technique in 1 stages(s).  CLEAR MARGINS OBTAINED and Final defect size was 1.5cm.       REPAIR SECOND INTENT: We discussed the options for wound management in full with the patient including risks/benefits/possible outcomes. Decision made to allow the wound to heal by second intention. EBL minimal; complications none; wound care routine.  The patient was discharged in good condition and will return in one month or prn for wound evaluation.    2. R temple squamous cell carcinoma   MOHS:   Location    After PGACAC discussed with patient, decision for Mohs surgery was made. Indication for Mohs was Location. Patient confirmed the site with Dr. Edmonds.  After anesthesia with LEC, the tumor was excised using standard Mohs technique in 1 stages(s).  CLEAR MARGINS OBTAINED and Final defect size was 1.1 cm.       REPAIR SECOND INTENT: We discussed the options for wound management in full with the patient including risks/benefits/possible outcomes. Decision made to allow the wound to heal by second intention. EBL minimal; complications none; wound care routine.  The patient was discharged in good condition and will return in one month or prn for wound evaluation.    BENIGN LESIONS DISCUSSED WITH PATIENT:  I discussed the specifics of tumor, prognosis, and genetics of benign lesions.  I explained that treatment of these lesions would be purely cosmetic and not medically neccessary.  I discussed with patient different removal options including excision, cautery and /or laser.      Nature and genetics of benign skin lesions dicussed with patient.  Signs and Symptoms of skin cancer discussed with patient.  Patient encouraged to perform monthly skin exams.  UV precautions reviewed with patient.  Skin care regimen reviewed with patient: Eliminate harsh soaps, i.e. Dial, zest, irsih spring; Mild soaps such as Cetaphil or Dove sensitive skin, avoid hot  or cold showers, aggressive use of emollients including vanicream, cetaphil or cerave discussed with patient.    Risks of non-melanoma skin cancer discussed with patient   Return to clinic 6 months

## 2018-08-23 ENCOUNTER — OFFICE VISIT (OUTPATIENT)
Dept: DERMATOLOGY | Facility: CLINIC | Age: 68
End: 2018-08-23
Payer: COMMERCIAL

## 2018-08-23 VITALS — SYSTOLIC BLOOD PRESSURE: 146 MMHG | DIASTOLIC BLOOD PRESSURE: 71 MMHG | HEART RATE: 77 BPM | OXYGEN SATURATION: 97 %

## 2018-08-23 DIAGNOSIS — D22.9 NEVUS: ICD-10-CM

## 2018-08-23 DIAGNOSIS — L82.0 INFLAMED SEBORRHEIC KERATOSIS: Primary | ICD-10-CM

## 2018-08-23 DIAGNOSIS — Z85.828 HISTORY OF SQUAMOUS CELL CARCINOMA OF SKIN: ICD-10-CM

## 2018-08-23 DIAGNOSIS — D18.00 ANGIOMA: ICD-10-CM

## 2018-08-23 DIAGNOSIS — L81.4 LENTIGO: ICD-10-CM

## 2018-08-23 DIAGNOSIS — L82.1 SEBORRHEIC KERATOSIS: ICD-10-CM

## 2018-08-23 PROCEDURE — 99214 OFFICE O/P EST MOD 30 MIN: CPT | Mod: 25 | Performed by: PHYSICIAN ASSISTANT

## 2018-08-23 PROCEDURE — 17110 DESTRUCTION B9 LES UP TO 14: CPT | Performed by: PHYSICIAN ASSISTANT

## 2018-08-23 NOTE — PROGRESS NOTES
HPI:   Narinder Edmonds is a 67 year old male who presents for Full skin cancer screening.  chief complaint  Last Skin Exam: 6 mo ago      1st Baseline: no  Personal HX of Skin Cancer: Yes SCC   Personal HX of Malignant Melanoma: no   Family HX of Skin Cancer / Malignant Melanoma: no  Personal HX of Atypical Moles:   no  Risk factors: history of frequent sun exposure; he is outdoors a lot.   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 Likes to fish.   On review of systems, there are no further skin complaints, patient is feeling otherwise well.  See patient intake sheet.  ROS of the following were done and are negative: Constitutional, Eyes, Ears, Nose,   Mouth, Throat, Cardiovascular, Respiratory, GI, Genitourinary, Musculoskeletal,   Psychiatric, Endocrine, Allergic/Immunologic.    PHYSICAL EXAM:   /71  Pulse 77  SpO2 97%  Skin exam performed as follows: Type 2 skin. Mood appropriate  Alert and Oriented X 3. Well developed, well nourished in no distress.  General appearance: Normal  Head including face: Normal  Eyes: conjunctiva and lids: Normal  Mouth: Lips, teeth, gums: Normal  Neck: Normal  Chest-breast/axillae: Normal  Back: Normal  Spleen and liver: Normal  Cardiovascular: Exam of peripheral vascular system by observation for swelling, varicosities, edema: Normal  Genitalia: groin, buttocks: Normal  Extremities: digits/nails (clubbing): Normal  Eccrine and Apocrine glands: Normal  Right upper extremity: Normal  Left upper extremity: Normal  Right lower extremity: Normal  Left lower extremity: Normal  Skin: Scalp and body hair: See below    Pt deferred exam of breasts, groin, buttocks: No    Other physical findings:  1. Multiple pigmented macules on extremities and trunk  2. Multiple pigmented macules on face, trunk and extremities  3. Multiple vascular papules on trunk, arms and legs  4. Multiple scattered keratotic plaques  5. Inflamed  keratotic papule on the right thigh x 1       Except as noted above, no other signs of skin cancer or melanoma.     ASSESSMENT/PLAN:   Benign Full skin cancer screening today. . Patient with history of SCC  Advised on monthly self exams and 1 year  Patient Education: Appropriate brochures given.    Multiple benign appearing nevi on arms, legs and trunk. Discussed ABCDEs of melanoma and sunscreen.   Multiple lentigos on arms, legs and trunk. Advised benign, no treatment needed.  Multiple scattered angiomas. Advised benign, no treatment needed.   Seborrheic keratosis on arms, legs and trunk. Advised benign, no treatment needed.  Inflamed seborrheic keratosis on right thigh x 1. As physically tender cryosurgery performed. Advised on post op care.   Patient to follow up with Primary Care provider regarding elevated blood pressure.        Follow-up: yearly FSE/PRN sooner    1.) Patient was asked about new and changing moles. YES  2.) Patient received a complete physical skin examination: YES  3.) Patient was counseled to perform a monthly self skin examination: YES  Scribed By: Jonelle Garnett MS, PALOYD

## 2018-08-23 NOTE — NURSING NOTE
Chief Complaint   Patient presents with     Skin Check       Vitals:    08/23/18 1710   BP: 146/71   Pulse: 77   SpO2: 97%     Wt Readings from Last 1 Encounters:   09/12/17 92.1 kg (203 lb)   Polina Deluca LPN.................8/23/2018

## 2018-08-23 NOTE — MR AVS SNAPSHOT
After Visit Summary   8/23/2018    Narinder Edmonds    MRN: [id number]           Patient Information     Date Of Birth          1950        Visit Information        Provider Department      8/23/2018 5:15 PM Jonelle Garnett PA-C [facility]        Care Instructions    WOUND CARE INSTRUCTIONS   FOR CRYOSURGERY   This area treated with liquid nitrogen should form a blister (areas treated may or may not blister-skin may just turn dark and slough off). You do not need to bandage the area unless a blister forms and breaks (which may be a few days). When the blister breaks, begin daily dressing changes as follows:  1) Clean and dry the area with tap water using clean Q-tip or sterile gauze pad.   2) Apply Polysporin ointment or Bacitracin ointment over entire wound. Do NOT use Neosporin ointment.   3) Cover the wound with a band-aid or sterile non-stick gauze pad and micropore paper tape.   REPEAT THESE INSTRUCTIONS AT LEAST ONCE A DAY UNTIL THE WOUND HAS COMPLETELY HEALED.   It is an old wives tale that a wound heals better when it is exposed to air and allowed to dry out. The wound will heal faster with a better cosmetic result if it is kept moist with ointment and covered with a bandage.   Do not let the wound dry out.   IMPORTANT INFORMATION ON REVERSE SIDE   Supplies Needed:   *Cotton tipped applicators (Q-tips)   *Polysporin ointment or Bacitracin ointment (NOT NEOSPORIN)   *Band-aids, or non stick gauze pads and micropore paper tape   PATIENT INFORMATION   During the healing process you will notice a number of changes. All wounds develop a small halo of redness surrounding the wound. This means healing is occurring. Severe itching with extensive redness usually indicates sensitivity to the ointment or bandage tape used to dress the wound. You should call our office if this develops.   Swelling and/or discoloration around your surgical site is common, particularly when performed  around the eye.   All wounds normally drain. The larger the wound the more drainage there will be. After 7-10 days, you will notice the wound beginning to shrink and new skin will begin to grow. The wound is healed when you can see skin has formed over the entire area. A healed wound has a healthy, shiny look to the surface and is red to dark pink in color to normalize. Wounds may take approximately 4-6 weeks to heal. Larger wounds may take 6-8 weeks. After the wound is healed you may discontinue dressing changes.   You may experience a sensation of tightness as your wound heals. This is normal and will gradually subside.   Your healed wound may be sensitive to temperature changes. This sensitivity improves with time, but if you re having a lot of discomfort, try to avoid temperature extremes.   Patients frequently experience itching after their wound appears to have healed because of the continue healing under the skin. Plain Vaseline will help relieve the itching.                 Follow-ups after your visit        Who to contact     If you have questions or need follow up information about today's clinic visit or your schedule please contact [facility] directly at [phone].  Normal or non-critical lab and imaging results will be communicated to you by [employer], letter or phone within 4 business days after the clinic has received the results. If you do not hear from us within 7 days, please contact the clinic through [employer] or phone. If you have a critical or abnormal lab result, we will notify you by phone as soon as possible.  Submit refill requests through [employer] or call your pharmacy and they will forward the refill request to us. Please allow 3 business days for your refill to be completed.          Additional Information About Your Visit        [employer] Information     [employer] lets you send messages to your doctor, view your test results, renew your prescriptions, schedule appointments and  "more. To sign up, go to [email or web address] . Click on \"Log in\" on the left side of the screen, which will take you to the Welcome page. Then click on \"Sign up Now\" on the right side of the page.     You will be asked to enter the access code listed below, as well as some personal information. Please follow the directions to create your username and password.     Your access code is: [email or web address]  Expires: 2018  5:23 PM     Your access code will  in 90 days. If you need help or a new code, please call your [facility] clinic or [phone].        Care EveryWhere ID     This is your Care EveryWhere ID. This could be used by other organizations to access your [facility] medical records  [id number]        Your Vitals Were     Pulse Pulse Oximetry                77 97%           Blood Pressure from Last 3 Encounters:   18 146/71   10/11/17 136/67   17 143/71    Weight from Last 3 Encounters:   17 92.1 kg (203 lb)   17 95.3 kg (210 lb)   12 90.7 kg (200 lb)              Today, you had the following     No orders found for display       Primary Care Provider Office Phone # Fax #    Jeff Beltran [phone]       [facility] [address] [address]        Equal Access to Services     DAV YOUNG AH: Hadii aleah wesley hadasho Sooctavio, waaxda luqadaha, qaybta kaalmada ademary, taj novoa. So [facility] [phone].    ATENCIÓN: Si habla español, tiene a ferris disposición servicios gratuitos de asistencia lingüística. Llame al [phone].    We comply with applicable federal civil rights laws and [place] laws. We do not discriminate on the basis of race, color, national origin, age, disability, sex, sexual orientation, or gender identity.            Thank you!     Thank you for choosing [facility]  for your care. Our goal is always to provide you with excellent care. Hearing back from our patients is " one way we can continue to improve our services. Please take a few minutes to complete the written survey that you may receive in the mail after your visit with us. Thank you!             Your Updated Medication List - Protect others around you: Learn how to safely use, store and throw away your medicines at www.disposemymeds.org.          This list is accurate as of 8/23/18  5:23 PM.  Always use your most recent med list.                   Brand Name Dispense Instructions for use Diagnosis    ACETAMINOPHEN PO      Take  by mouth.        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        * Clobetasol 17 Propionate Powd           * clobetasol propionate 0.05 % Sham      AS PRESCRIBED        * Clobetasol Propionate Powd      AS PRESCRIBED        * clobetasol 0.05 % external solution    TEMOVATE    100 mL    Apply sparingly to affected area twice daily on scalp. Do not apply to face.    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        FELDENE 20 MG capsule   Generic drug:  piroxicam      1 CAPSULE ORALLY DAILY        FLEXERIL PO      Take  by mouth.        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        metoprolol tartrate 50 MG tablet    LOPRESSOR     Take 50 mg by mouth        Multivitamin   /Iron Tabs      None Entered        PRILOSEC PO      Take  by mouth.        rivaroxaban ANTICOAGULANT 20 MG Tabs tablet    XARELTO     Take 20 mg by mouth        SYNTHROID PO      Take  by mouth.        tobramycin-dexamethasone 0.3-0.1 % ophthalmic susp    TOBRADEX     1 drop every 4 hours (while awake).        VITAMIN C PO      Take  by mouth.        * Notice:  This list has 5 medication(s) that are the same as other medications prescribed for you. Read the directions carefully, and ask your doctor or other care provider to review them with you.

## 2018-08-23 NOTE — PATIENT INSTRUCTIONS
WOUND CARE INSTRUCTIONS   FOR CRYOSURGERY   This area treated with liquid nitrogen should form a blister (areas treated may or may not blister-skin may just turn dark and slough off). You do not need to bandage the area unless a blister forms and breaks (which may be a few days). When the blister breaks, begin daily dressing changes as follows:  1) Clean and dry the area with tap water using clean Q-tip or sterile gauze pad.   2) Apply Polysporin ointment or Bacitracin ointment over entire wound. Do NOT use Neosporin ointment.   3) Cover the wound with a band-aid or sterile non-stick gauze pad and micropore paper tape.   REPEAT THESE INSTRUCTIONS AT LEAST ONCE A DAY UNTIL THE WOUND HAS COMPLETELY HEALED.   It is an old wives tale that a wound heals better when it is exposed to air and allowed to dry out. The wound will heal faster with a better cosmetic result if it is kept moist with ointment and covered with a bandage.   Do not let the wound dry out.   IMPORTANT INFORMATION ON REVERSE SIDE   Supplies Needed:   *Cotton tipped applicators (Q-tips)   *Polysporin ointment or Bacitracin ointment (NOT NEOSPORIN)   *Band-aids, or non stick gauze pads and micropore paper tape   PATIENT INFORMATION   During the healing process you will notice a number of changes. All wounds develop a small halo of redness surrounding the wound. This means healing is occurring. Severe itching with extensive redness usually indicates sensitivity to the ointment or bandage tape used to dress the wound. You should call our office if this develops.   Swelling and/or discoloration around your surgical site is common, particularly when performed around the eye.   All wounds normally drain. The larger the wound the more drainage there will be. After 7-10 days, you will notice the wound beginning to shrink and new skin will begin to grow. The wound is healed when you can see skin has formed over the entire area. A healed wound has a healthy, shiny  look to the surface and is red to dark pink in color to normalize. Wounds may take approximately 4-6 weeks to heal. Larger wounds may take 6-8 weeks. After the wound is healed you may discontinue dressing changes.   You may experience a sensation of tightness as your wound heals. This is normal and will gradually subside.   Your healed wound may be sensitive to temperature changes. This sensitivity improves with time, but if you re having a lot of discomfort, try to avoid temperature extremes.   Patients frequently experience itching after their wound appears to have healed because of the continue healing under the skin. Plain Vaseline will help relieve the itching.

## 2018-08-23 NOTE — LETTER
8/23/2018         RE: aNrinder Edmonds  [address] [address] [place] [address]        Dear Colleague,    Thank you for referring your patient, Narinder Edmonds, to the [facility]. Please see a copy of my visit note below.    HPI:   Narinder Edmonds is a 67 year old male who presents for Full skin cancer screening.  chief complaint  Last Skin Exam: 6 mo ago      1st Baseline: no  Personal HX of Skin Cancer: Yes SCC   Personal HX of Malignant Melanoma: no   Family HX of Skin Cancer / Malignant Melanoma: no  Personal HX of Atypical Moles:   no  Risk factors: history of frequent sun exposure; he is outdoors a lot.   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 Likes to fish.   On review of systems, there are no further skin complaints, patient is feeling otherwise well.  See patient intake sheet.  ROS of the following were done and are negative: Constitutional, Eyes, Ears, Nose,   Mouth, Throat, Cardiovascular, Respiratory, GI, Genitourinary, Musculoskeletal,   Psychiatric, Endocrine, Allergic/Immunologic.    PHYSICAL EXAM:   /71  Pulse 77  SpO2 97%  Skin exam performed as follows: Type 2 skin. Mood appropriate  Alert and Oriented X 3. Well developed, well nourished in no distress.  General appearance: Normal  Head including face: Normal  Eyes: conjunctiva and lids: Normal  Mouth: Lips, teeth, gums: Normal  Neck: Normal  Chest-breast/axillae: Normal  Back: Normal  Spleen and liver: Normal  Cardiovascular: Exam of peripheral vascular system by observation for swelling, varicosities, edema: Normal  Genitalia: groin, buttocks: Normal  Extremities: digits/nails (clubbing): Normal  Eccrine and Apocrine glands: Normal  Right upper extremity: Normal  Left upper extremity: Normal  Right lower extremity: Normal  Left lower extremity: Normal  Skin: Scalp and body hair: See below    Pt deferred exam of breasts, groin, buttocks: No    Other  physical findings:  1. Multiple pigmented macules on extremities and trunk  2. Multiple pigmented macules on face, trunk and extremities  3. Multiple vascular papules on trunk, arms and legs  4. Multiple scattered keratotic plaques  5. Inflamed keratotic papule on the right thigh x 1       Except as noted above, no other signs of skin cancer or melanoma.     ASSESSMENT/PLAN:   Benign Full skin cancer screening today. . Patient with history of SCC  Advised on monthly self exams and 1 year  Patient Education: Appropriate brochures given.    Multiple benign appearing nevi on arms, legs and trunk. Discussed ABCDEs of melanoma and sunscreen.   Multiple lentigos on arms, legs and trunk. Advised benign, no treatment needed.  Multiple scattered angiomas. Advised benign, no treatment needed.   Seborrheic keratosis on arms, legs and trunk. Advised benign, no treatment needed.  Inflamed seborrheic keratosis on right thigh x 1. As physically tender cryosurgery performed. Advised on post op care.   Patient to follow up with Primary Care provider regarding elevated blood pressure.        Follow-up: yearly FSE/PRN sooner    1.) Patient was asked about new and changing moles. YES  2.) Patient received a complete physical skin examination: YES  3.) Patient was counseled to perform a monthly self skin examination: YES  Scribed By: Jonelle Garnett, MS, PAGregorC      Again, thank you for allowing me to participate in the care of your patient.        Sincerely,        Jonelle Garnett PA-C

## 2018-09-21 DIAGNOSIS — G47.33 OSA (OBSTRUCTIVE SLEEP APNEA): Primary | Chronic | ICD-10-CM

## 2018-12-06 DIAGNOSIS — L82.1 SK (SEBORRHEIC KERATOSIS): ICD-10-CM

## 2018-12-06 DIAGNOSIS — C76.1 PRIMARY SQUAMOUS CELL CARCINOMA OF CHEST WALL (H): ICD-10-CM

## 2018-12-06 DIAGNOSIS — L82.0 INFLAMED SEBORRHEIC KERATOSIS: ICD-10-CM

## 2018-12-06 DIAGNOSIS — L40.9 PSORIASIS: ICD-10-CM

## 2018-12-06 DIAGNOSIS — C44.329 SQUAMOUS CELL CANCER OF SKIN OF LEFT TEMPLE: ICD-10-CM

## 2018-12-06 DIAGNOSIS — L81.4 LENTIGO: ICD-10-CM

## 2018-12-06 RX ORDER — CLOBETASOL PROPIONATE 0.5 MG/ML
SOLUTION TOPICAL
Qty: 100 ML | Refills: 3 | Status: SHIPPED | OUTPATIENT
Start: 2018-12-06

## 2018-12-06 NOTE — TELEPHONE ENCOUNTER
Reason for Call:  Medication or medication refill:    Do you use a [facility]?  Name of the pharmacy and phone number for the current request:  [facility] [place] [phone]    Name of the medication requested: clobetasol     Other request: LOV:  8/23/18  LAST REFILL:  11/27/18    Can we leave a detailed message on this number? YES    Phone number patient can be reached at: Home number on file [phone] (home)    Best Time: any     Call taken on 12/6/2018 at 3:48 PM by Arlene Casas

## 2019-04-16 ENCOUNTER — OFFICE VISIT (OUTPATIENT)
Dept: SLEEP MEDICINE | Facility: CLINIC | Age: 69
End: 2019-04-16
Payer: MEDICARE

## 2019-04-16 DIAGNOSIS — G47.33 OSA (OBSTRUCTIVE SLEEP APNEA): Primary | ICD-10-CM

## 2019-04-16 PROBLEM — I25.10 CORONARY ARTERY DISEASE INVOLVING NATIVE CORONARY ARTERY OF NATIVE HEART WITHOUT ANGINA PECTORIS: Chronic | Status: ACTIVE | Noted: 2019-04-15

## 2019-04-16 PROBLEM — I25.10 CORONARY ARTERY DISEASE INVOLVING NATIVE CORONARY ARTERY OF NATIVE HEART WITHOUT ANGINA PECTORIS: Status: ACTIVE | Noted: 2019-04-15

## 2019-04-16 PROCEDURE — 99214 OFFICE O/P EST MOD 30 MIN: CPT | Performed by: PHYSICIAN ASSISTANT

## 2019-04-16 ASSESSMENT — MIFFLIN-ST. JEOR: SCORE: 1679.82

## 2019-04-16 NOTE — PROGRESS NOTES
Obstructive Sleep Apnea - PAP Follow-Up Visit:    Chief Complaint   Patient presents with     CPAP Follow Up     no concerns.        Narinder Edmonds comes in today for follow-up of their moderate sleep apnea, managed with CPAP.     Narinder Edmonds had a sleep study at [facility] in October 2010(AHI 26, lowest oxygen saturation was 83%) for excessive daytime sleepiness. He was prescribed auto-CPAP 8-12 cm/H2O. He underwent an all night PAP titration on 6/19/2011(205#, CPAP 15 cm/H20). He is currently on CPAP 9-15 cm/H20.    Oximetry results were obtained for the date of 9/25/2017.  The patient was on a treatment of CPAP 9-15 cm. The study showed a valid recording time of 7 hours and 19 minutes with a 4% desaturation index of 4.9.  The basal oxygen saturation was 94.1% and the lowest SpO2 was 87-88%.  The patient spent 0.3 minutes below 88%. No significant hypoxemia on current CPAP treatment.      He had a stent placed in 12/2018. He has shingles on his left face and unable to use CPAP for 2 weeks.        Overall, the patient rates his experience with PAP as 10 (0 poor, 10 great). The mask is comfortable. The mask is not leaking.  He is not snoring with the mask on. He is not having gasp arousals. He is not having any oral or nasal dryness. The pressure settings are comfortable.    His PAP interface is Full Face Mask.    Bedtime is typically 10:30 PM. Usually it takes about 5-90 minutes to fall asleep with the mask on. He has back pain that is waking him. He is going to see Pain management. Wake time is typically 4-5 AM.  Patient is using PAP therapy 6 hours per night. The patient is usually getting 6 hours of sleep per night.    He does feel rested in the morning.    Total score - [place]: 14 (4/16/2019  9:00 AM). Baseline 20. He has no difficulty being alert with driving.     Respironics  Auto-PAP 9 - 15 cmH2O 30 day usage data:    93.3% of days with > 4 hours of use. 0/30 days with no use.   Average use 6 hours and  Office note from office visit on 3/7/19 was faxed to Janneth @ [facility], # [phone]  States she is clear for surgery.   "11 minutes per day.   Average large leak 0 seconds.   CPAP 90% pressure 12.7cm.   AHI 6.3 events per hour.      Past medical/surgical history, family history, social history, medications and allergies were reviewed.      Problem List:  Patient Active Problem List    Diagnosis Date Noted     Atrial fibrillation with rapid ventricular response (H) 09/01/2017     Priority: Medium     Psoriasis 01/26/2017     Priority: Medium     Sicca syndrome (H) 04/02/2015     Priority: Medium     Sensorineural hearing loss 01/29/2015     Priority: Medium     Degeneration of intervertebral disc of lumbar region 04/12/2012     Priority: Medium     Horseshoe kidney 03/20/2012     Priority: Medium     GISELLE (obstructive sleep apnea) 03/25/2010     Priority: Medium     sleep study at [facility] in October 2010(AHI 26, lowest oxygen saturation was 83%) for excessive daytime sleepiness. He was prescribed auto-CPAP 8-12 cm/H2O.   He underwent an all night PAP titration on 6/19/2011(205#, CPAP 15 cm/H20).       Hypertension 03/17/2009     Priority: Medium     Gastroesophageal reflux disease 12/07/2006     Priority: Medium     Acquired hypothyroidism 12/07/2006     Priority: Medium        BP (P) 124/71   Pulse (P) 87   Ht (P) 1.778 m (5' 10\")   Wt (P) 90.4 kg (199 lb 3.2 oz)   SpO2 (P) 94%   BMI (P) 28.58 kg/m      Impression/Plan:    Moderate Sleep apnea.   Tolerating PAP well. Daytime symptoms are improved.   Change to auto-CPAP 12-15 cm/H20.  He will bring his CPAP in 2 weeks for a download.     Narinder Edmonds will follow up in about 2 years or sooner if any concerns.    Twenty-five minutes spent with patient, all of which were spent face-to-face counseling, consulting, coordinating plan of care regarding GISELLE.      Jaclyn Gray PA-C    "

## 2019-04-16 NOTE — PATIENT INSTRUCTIONS
Your Body mass index is 28.58 kg/m  (pended).  Weight management is a personal decision.  If you are interested in exploring weight loss strategies, the following discussion covers the approaches that may be successful. Body mass index (BMI) is one way to tell whether you are at a healthy weight, overweight, or obese. It measures your weight in relation to your height.  A BMI of 18.5 to 24.9 is in the healthy range. A person with a BMI of 25 to 29.9 is considered overweight, and someone with a BMI of 30 or greater is considered obese. More than two-thirds of American adults are considered overweight or obese.  Being overweight or obese increases the risk for further weight gain. Excess weight may lead to heart disease and diabetes.  Creating and following plans for healthy eating and physical activity may help you improve your health.  Weight control is part of healthy lifestyle and includes exercise, emotional health, and healthy eating habits. Careful eating habits lifelong are the mainstay of weight control. Though there are significant health benefits from weight loss, long-term weight loss with diet alone may be very difficult to achieve- studies show long-term success with dietary management in less than 10% of people. Attaining a healthy weight may be especially difficult to achieve in those with severe obesity. In some cases, medications, devices and surgical management might be considered.  What can you do?  If you are overweight or obese and are interested in methods for weight loss, you should discuss this with your provider.     Consider reducing daily calorie intake by 500 calories.     Keep a food journal.     Avoiding skipping meals, consider cutting portions instead.    Diet combined with exercise helps maintain muscle while optimizing fat loss. Strength training is particularly important for building and maintaining muscle mass. Exercise helps reduce stress, increase energy, and improves  fitness. Increasing exercise without diet control, however, may not burn enough calories to loose weight.       Start walking three days a week 10-20 minutes at a time    Work towards walking thirty minutes five days a week     Eventually, increase the speed of your walking for 1-2 minutes at time    In addition, we recommend that you review healthy lifestyles and methods for weight loss available through the National Institutes of Health patient information sites:  http://win.niddk.nih.gov/publications/index.htm    And look into health and wellness programs that may be available through your health insurance provider, employer, local community center, or joaquin club.    Weight management plan: Patient was referred to their PCP to discuss a diet and exercise plan.

## 2019-04-17 ENCOUNTER — OFFICE VISIT (OUTPATIENT)
Dept: SLEEP MEDICINE | Facility: CLINIC | Age: 69
End: 2019-04-17
Payer: MEDICARE

## 2019-04-17 DIAGNOSIS — G47.33 OSA (OBSTRUCTIVE SLEEP APNEA): Primary | ICD-10-CM

## 2019-04-17 NOTE — PROGRESS NOTES
SUBJECTIVE:  Chief Complaint   Patient presents with     Sleep Problem     change pressure on machine per order      OBJECTIVE:  Narinder was seen in clinic yesterday. Physician requested pressure change but we were unable to do it in clinic because Narinder's machine is a previous model and we do not have a power cord available in clinic.     Narinder returns today with complete machine and power cord.     ASSESSMENT/PLAN:  Pressure updated to reflect most current order.

## 2019-06-03 ENCOUNTER — TELEPHONE (OUTPATIENT)
Dept: SLEEP MEDICINE | Facility: CLINIC | Age: 69
End: 2019-06-03

## 2019-06-03 DIAGNOSIS — G47.33 OSA (OBSTRUCTIVE SLEEP APNEA): Primary | ICD-10-CM

## 2019-06-03 NOTE — TELEPHONE ENCOUNTER
Pt called and would like his pressures changed back to his previous settings. He statesthat it is now at 11, but he would like to brought back down to 9.    He would like a call when this has been completed.    (Forwarded to Dr. Peterson to review)    Tamie Gustafson, CMA

## 2019-06-04 NOTE — TELEPHONE ENCOUNTER
Called and lvm for pt letting him know that the change request has been sent and he will hopefully notice the change in pressure tonight.    Tamie Gustafson, CMA

## 2019-06-04 NOTE — TELEPHONE ENCOUNTER
It appears he was on auto-titrate 9-15 and was adjusted to 12-15.  I will place a DME order to remote change back to auto-titrate 9-15 cm H2O.

## 2019-06-06 NOTE — TELEPHONE ENCOUNTER
CALLED PATIENT TO SCHEDULE APPT TO COME IN TO COMPLETE PRESSURE CHANGE IN CLINIC AS HIS MACHINE DOES NOT HAVE A MODEM    APPT FOR 6/7/19 AT 10:30 AM

## 2019-06-07 ENCOUNTER — DOCUMENTATION ONLY (OUTPATIENT)
Dept: SLEEP MEDICINE | Facility: CLINIC | Age: 69
End: 2019-06-07
Payer: MEDICARE

## 2019-06-07 NOTE — PROGRESS NOTES
Pt came to [facility] for a pressure change on his CPAP. Changed pressure from 12-15 cm H2O to 9-15 cm H2O per Dr. Pham orders. Pt states that he sometimes has to tighten the straps really tight in order for the CPAP not to leak. Discussed changing supplies more often as last time patient received new supplies was 12/2018. Demo a new cushion vs. A well used cushion. Pt states understanding and received a new cushion and disposable fitlers today. Pt was provided supply reorder schedule handout for future reference and locations and numbers to call when needing supplies.     Tamie Edmonds, DME Coordinator

## 2019-10-31 ENCOUNTER — DOCUMENTATION ONLY (OUTPATIENT)
Dept: SLEEP MEDICINE | Facility: CLINIC | Age: 69
End: 2019-10-31
Payer: MEDICARE

## 2019-10-31 ENCOUNTER — OFFICE VISIT (OUTPATIENT)
Dept: SLEEP MEDICINE | Facility: CLINIC | Age: 69
End: 2019-10-31
Payer: MEDICARE

## 2019-10-31 VITALS
BODY MASS INDEX: 27.63 KG/M2 | HEIGHT: 70 IN | SYSTOLIC BLOOD PRESSURE: 120 MMHG | HEART RATE: 67 BPM | DIASTOLIC BLOOD PRESSURE: 63 MMHG | OXYGEN SATURATION: 95 % | WEIGHT: 193 LBS

## 2019-10-31 DIAGNOSIS — G47.33 OSA (OBSTRUCTIVE SLEEP APNEA): Primary | ICD-10-CM

## 2019-10-31 PROCEDURE — 99214 OFFICE O/P EST MOD 30 MIN: CPT | Performed by: FAMILY MEDICINE

## 2019-10-31 RX ORDER — ATORVASTATIN CALCIUM 40 MG/1
40 TABLET, FILM COATED ORAL DAILY
Refills: 0 | COMMUNITY
Start: 2019-10-21

## 2019-10-31 RX ORDER — SOTALOL HYDROCHLORIDE 80 MG/1
TABLET ORAL
Refills: 2 | COMMUNITY
Start: 2019-10-21

## 2019-10-31 ASSESSMENT — MIFFLIN-ST. JEOR: SCORE: 1646.69

## 2019-10-31 NOTE — PROGRESS NOTES
Patient was offered choice of vendor and chose [facility].  Patient Narinder YARI Edmonds was set up at [facility]  on October 31, 2019. Patient received a Resmed AirSense 10 Auto. Pressures were set at 9-15 cm H2O.   Patient s ramp is off cm H2O for Off and FLEX/EPR is 2.  Patient received a Resmed Mask name: [id number]  Full Face mask size Medium, heated tubing and heated humidifier.  Patient is not enrolled in the STM Program and does need to meet compliance. Patient has a follow up on 1/16/20 with Dr. Peterson.    Tamie Edmonds

## 2019-10-31 NOTE — PROGRESS NOTES
Obstructive Sleep Apnea - PAP Follow-Up Visit:    Chief Complaint   Patient presents with     CPAP Follow Up       Narinder Edmonds comes in today for follow-up of their moderate obstructive sleep apnea, managed with CPAP.     Narinder Edmonds had a sleep study at [facility] in October 2010(AHI 26, lowest oxygen saturation was 83%) for excessive daytime sleepiness. He was prescribed auto-CPAP 8-12 cm/H2O. He underwent an all night PAP titration on 6/19/2011(205#, CPAP 15 cm/H20). He is currently on CPAP 9-15 cm/H20.     Oximetry results were obtained for the date of 9/25/2017.  The patient was on a treatment of CPAP 9-15 cm. The study showed a valid recording time of 7 hours and 19 minutes with a 4% desaturation index of 4.9.  The basal oxygen saturation was 94.1% and the lowest SpO2 was 87-88%.  The patient spent 0.3 minutes below 88%. No significant hypoxemia on current CPAP treatment.    Last visit with Jaclyn Gray on 4/16/2019 and doing well on CPAP auto-titrate 9-15 cm H2O.  No changes.    He returns today in order to get a replacement CPAP.  Otherwise, GISELLE appears well controlled.  Humidity no longer seems to be working with excessive humidity and heat, even with humidity setting at 1 or off.  His is original from 2010, repaired in 2012.    CPAP download from 10/1/2019 - 10/30/2019 on auto-titrate 9-15 cm H2O.  Used 30/30 days, average usage 6 hours 8 minutes.  Pressure mean 10 cm H2O, 90th%ile 11.9 cm H2O.  AHI 4.1.    Problem List:  Patient Active Problem List    Diagnosis Date Noted     Coronary artery disease involving native coronary artery of native heart without angina pectoris 04/15/2019     Priority: Medium     LAD stent placed 12/26/18       Atrial fibrillation with rapid ventricular response (H) 09/01/2017     Priority: Medium     Psoriasis 01/26/2017     Priority: Medium     Sicca syndrome (H) 04/02/2015     Priority: Medium     Sensorineural hearing loss 01/29/2015     Priority: Medium      "Degeneration of intervertebral disc of lumbar region 04/12/2012     Priority: Medium     Horseshoe kidney 03/20/2012     Priority: Medium     GISELLE (obstructive sleep apnea) 03/25/2010     Priority: Medium     sleep study at [facility] in October 2010(AHI 26, lowest oxygen saturation was 83%) for excessive daytime sleepiness. He was prescribed auto-CPAP 8-12 cm/H2O.   He underwent an all night PAP titration on 6/19/2011(205#, CPAP 15 cm/H20).       Hypertension 03/17/2009     Priority: Medium     Gastroesophageal reflux disease 12/07/2006     Priority: Medium     Acquired hypothyroidism 12/07/2006     Priority: Medium          /63   Pulse 67   Ht 1.778 m (5' 10\")   Wt 87.5 kg (193 lb)   SpO2 95%   BMI 27.69 kg/m      Impression/Plan:    1.)  Moderate GISELLE   - Appears well controlled with CPAP auto-titrate 9-15 cm H2O.   - Current CPAP appears to be malfunctioning with excessive moisture and heat, even with humidity off.  Put in for replacement CPAP with same pressure settings.      Narinder Edmonds will follow up in about 2 year(s).     Twenty-five minutes spent with patient, all of which were spent face-to-face counseling, consulting, coordinating plan of care.      Narinder Peterson MD, MD    CC:  Jeff Beltran  "

## 2019-10-31 NOTE — PATIENT INSTRUCTIONS
Your BMI is Body mass index is 27.69 kg/m .  Weight management is a personal decision.  If you are interested in exploring weight loss strategies, the following discussion covers the approaches that may be successful. Body mass index (BMI) is one way to tell whether you are at a healthy weight, overweight, or obese. It measures your weight in relation to your height.  A BMI of 18.5 to 24.9 is in the healthy range. A person with a BMI of 25 to 29.9 is considered overweight, and someone with a BMI of 30 or greater is considered obese. More than two-thirds of American adults are considered overweight or obese.  Being overweight or obese increases the risk for further weight gain. Excess weight may lead to heart disease and diabetes.  Creating and following plans for healthy eating and physical activity may help you improve your health.  Weight control is part of healthy lifestyle and includes exercise, emotional health, and healthy eating habits. Careful eating habits lifelong are the mainstay of weight control. Though there are significant health benefits from weight loss, long-term weight loss with diet alone may be very difficult to achieve- studies show long-term success with dietary management in less than 10% of people. Attaining a healthy weight may be especially difficult to achieve in those with severe obesity. In some cases, medications, devices and surgical management might be considered.  What can you do?  If you are overweight or obese and are interested in methods for weight loss, you should discuss this with your provider.     Consider reducing daily calorie intake by 500 calories.     Keep a food journal.     Avoiding skipping meals, consider cutting portions instead.    Diet combined with exercise helps maintain muscle while optimizing fat loss. Strength training is particularly important for building and maintaining muscle mass. Exercise helps reduce stress, increase energy, and improves fitness.  Increasing exercise without diet control, however, may not burn enough calories to loose weight.       Start walking three days a week 10-20 minutes at a time    Work towards walking thirty minutes five days a week     Eventually, increase the speed of your walking for 1-2 minutes at time    In addition, we recommend that you review healthy lifestyles and methods for weight loss available through the National Institutes of Health patient information sites:  http://win.niddk.nih.gov/publications/index.htm    And look into health and wellness programs that may be available through your health insurance provider, employer, local community center, or [employer]quin club.    Weight management plan: Patient was referred to their PCP to discuss a diet and exercise plan.        Your Body mass index is 27.69 kg/m .  Weight management is a personal decision.  If you are interested in exploring weight loss strategies, the following discussion covers the approaches that may be successful. Body mass index (BMI) is one way to tell whether you are at a healthy weight, overweight, or obese. It measures your weight in relation to your height.  A BMI of 18.5 to 24.9 is in the healthy range. A person with a BMI of 25 to 29.9 is considered overweight, and someone with a BMI of 30 or greater is considered obese. More than two-thirds of American adults are considered overweight or obese.  Being overweight or obese increases the risk for further weight gain. Excess weight may lead to heart disease and diabetes.  Creating and following plans for healthy eating and physical activity may help you improve your health.  Weight control is part of healthy lifestyle and includes exercise, emotional health, and healthy eating habits. Careful eating habits lifelong are the mainstay of weight control. Though there are significant health benefits from weight loss, long-term weight loss with diet alone may be very difficult to achieve- studies show long-term  success with dietary management in less than 10% of people. Attaining a healthy weight may be especially difficult to achieve in those with severe obesity. In some cases, medications, devices and surgical management might be considered.  What can you do?  If you are overweight or obese and are interested in methods for weight loss, you should discuss this with your provider.     Consider reducing daily calorie intake by 500 calories.     Keep a food journal.     Avoiding skipping meals, consider cutting portions instead.    Diet combined with exercise helps maintain muscle while optimizing fat loss. Strength training is particularly important for building and maintaining muscle mass. Exercise helps reduce stress, increase energy, and improves fitness. Increasing exercise without diet control, however, may not burn enough calories to loose weight.       Start walking three days a week 10-20 minutes at a time    Work towards walking thirty minutes five days a week     Eventually, increase the speed of your walking for 1-2 minutes at time    In addition, we recommend that you review healthy lifestyles and methods for weight loss available through the National Institutes of Health patient information sites:  http://win.niddk.nih.gov/publications/index.htm    And look into health and wellness programs that may be available through your health insurance provider, employer, local community center, or [employer] club.    Weight management plan: Discussed healthy diet and exercise guidelines

## 2019-11-04 ENCOUNTER — DOCUMENTATION ONLY (OUTPATIENT)
Dept: SLEEP MEDICINE | Facility: CLINIC | Age: 69
End: 2019-11-04
Payer: MEDICARE

## 2019-11-04 NOTE — PROGRESS NOTES
3 DAY STM VISIT    Diagnostic AHI: 26 PSG    Patient contacted for 3 day STM visit  Subjective measures:  Patient doing well has no questions or concerns will be taken out of the STM.     Replacement device: Yes    STM ordered by provider: Yes     Device type: Auto-CPAP  PAP settings from order::  CPAP min 9 cm  H20       CPAP max 15 cm  H20  Mask type:    Full Face Mask     Device settings from machine      Min CPAP 9.0            Max CPAP 15.0      Assessment: Nightly usage over four hours.  Action plan: Patient removed from STM, STM not required or ordered. . Patient has a follow up visit scheduled:   yes within 61-90 days of set up.    Total time spent on remote patient monitoring data analysis and patient contact today:   13 minutes

## 2019-12-27 ENCOUNTER — OFFICE VISIT (OUTPATIENT)
Dept: SLEEP MEDICINE | Facility: CLINIC | Age: 69
End: 2019-12-27
Payer: MEDICARE

## 2019-12-27 VITALS
HEIGHT: 70 IN | BODY MASS INDEX: 27.17 KG/M2 | OXYGEN SATURATION: 99 % | DIASTOLIC BLOOD PRESSURE: 54 MMHG | SYSTOLIC BLOOD PRESSURE: 107 MMHG | HEART RATE: 77 BPM | WEIGHT: 189.8 LBS

## 2019-12-27 DIAGNOSIS — G47.33 OSA (OBSTRUCTIVE SLEEP APNEA): Primary | ICD-10-CM

## 2019-12-27 PROCEDURE — 99214 OFFICE O/P EST MOD 30 MIN: CPT | Performed by: FAMILY MEDICINE

## 2019-12-27 RX ORDER — DICLOFENAC SODIUM 100 MG/1
100 TABLET, FILM COATED, EXTENDED RELEASE ORAL
COMMUNITY
Start: 2019-12-06

## 2019-12-27 RX ORDER — DOCUSATE SODIUM 100 MG/1
100 CAPSULE, LIQUID FILLED ORAL
COMMUNITY
Start: 2019-12-03

## 2019-12-27 RX ORDER — LOSARTAN POTASSIUM 25 MG/1
50 TABLET ORAL
COMMUNITY
Start: 2019-11-14

## 2019-12-27 RX ORDER — ASPIRIN 81 MG/1
81 TABLET ORAL
COMMUNITY
Start: 2019-12-03

## 2019-12-27 RX ORDER — NITROGLYCERIN 0.4 MG/1
0.4 TABLET SUBLINGUAL
COMMUNITY
Start: 2018-12-26

## 2019-12-27 ASSESSMENT — MIFFLIN-ST. JEOR: SCORE: 1632.18

## 2019-12-27 NOTE — NURSING NOTE
"Chief Complaint   Patient presents with     CPAP Follow Up     No concerns       Initial Ht 1.778 m (5' 10\")   Wt 86.1 kg (189 lb 12.8 oz)   BMI 27.23 kg/m   Estimated body mass index is 27.23 kg/m  as calculated from the following:    Height as of this encounter: 1.778 m (5' 10\").    Weight as of this encounter: 86.1 kg (189 lb 12.8 oz).    Medication Reconciliation: complete      DME: [facility]    "

## 2019-12-27 NOTE — PATIENT INSTRUCTIONS
Your BMI is Body mass index is 27.23 kg/m .  Weight management is a personal decision.  If you are interested in exploring weight loss strategies, the following discussion covers the approaches that may be successful. Body mass index (BMI) is one way to tell whether you are at a healthy weight, overweight, or obese. It measures your weight in relation to your height.  A BMI of 18.5 to 24.9 is in the healthy range. A person with a BMI of 25 to 29.9 is considered overweight, and someone with a BMI of 30 or greater is considered obese. More than two-thirds of American adults are considered overweight or obese.  Being overweight or obese increases the risk for further weight gain. Excess weight may lead to heart disease and diabetes.  Creating and following plans for healthy eating and physical activity may help you improve your health.  Weight control is part of healthy lifestyle and includes exercise, emotional health, and healthy eating habits. Careful eating habits lifelong are the mainstay of weight control. Though there are significant health benefits from weight loss, long-term weight loss with diet alone may be very difficult to achieve- studies show long-term success with dietary management in less than 10% of people. Attaining a healthy weight may be especially difficult to achieve in those with severe obesity. In some cases, medications, devices and surgical management might be considered.  What can you do?  If you are overweight or obese and are interested in methods for weight loss, you should discuss this with your provider.     Consider reducing daily calorie intake by 500 calories.     Keep a food journal.     Avoiding skipping meals, consider cutting portions instead.    Diet combined with exercise helps maintain muscle while optimizing fat loss. Strength training is particularly important for building and maintaining muscle mass. Exercise helps reduce stress, increase energy, and improves fitness.  Increasing exercise without diet control, however, may not burn enough calories to loose weight.       Start walking three days a week 10-20 minutes at a time    Work towards walking thirty minutes five days a week     Eventually, increase the speed of your walking for 1-2 minutes at time    In addition, we recommend that you review healthy lifestyles and methods for weight loss available through the National Institutes of Health patient information sites:  http://win.niddk.nih.gov/publications/index.htm    And look into health and wellness programs that may be available through your health insurance provider, employer, local community center, or [employer] club.            Your Body mass index is 27.23 kg/m .  Weight management is a personal decision.  If you are interested in exploring weight loss strategies, the following discussion covers the approaches that may be successful. Body mass index (BMI) is one way to tell whether you are at a healthy weight, overweight, or obese. It measures your weight in relation to your height.  A BMI of 18.5 to 24.9 is in the healthy range. A person with a BMI of 25 to 29.9 is considered overweight, and someone with a BMI of 30 or greater is considered obese. More than two-thirds of American adults are considered overweight or obese.  Being overweight or obese increases the risk for further weight gain. Excess weight may lead to heart disease and diabetes.  Creating and following plans for healthy eating and physical activity may help you improve your health.  Weight control is part of healthy lifestyle and includes exercise, emotional health, and healthy eating habits. Careful eating habits lifelong are the mainstay of weight control. Though there are significant health benefits from weight loss, long-term weight loss with diet alone may be very difficult to achieve- studies show long-term success with dietary management in less than 10% of people. Attaining a healthy weight may be  especially difficult to achieve in those with severe obesity. In some cases, medications, devices and surgical management might be considered.  What can you do?  If you are overweight or obese and are interested in methods for weight loss, you should discuss this with your provider.     Consider reducing daily calorie intake by 500 calories.     Keep a food journal.     Avoiding skipping meals, consider cutting portions instead.    Diet combined with exercise helps maintain muscle while optimizing fat loss. Strength training is particularly important for building and maintaining muscle mass. Exercise helps reduce stress, increase energy, and improves fitness. Increasing exercise without diet control, however, may not burn enough calories to loose weight.       Start walking three days a week 10-20 minutes at a time    Work towards walking thirty minutes five days a week     Eventually, increase the speed of your walking for 1-2 minutes at time    In addition, we recommend that you review healthy lifestyles and methods for weight loss available through the National Institutes of Health patient information sites:  http://win.niddk.nih.gov/publications/index.htm    And look into health and wellness programs that may be available through your health insurance provider, employer, local community center, or [employer] club.

## 2020-01-02 NOTE — PROGRESS NOTES
Obstructive Sleep Apnea - PAP Follow-Up Visit:    Chief Complaint   Patient presents with     CPAP Follow Up     No concerns       Narinder Edmonds comes in today for CPAP compliance follow-up after receiving replacement CPAP for treatment of their moderate obstructive sleep apnea, managed with CPAP.     Sleep study at [facility] in October 2010(AHI 26, lowest oxygen saturation was 83%) for excessive daytime sleepiness. He was prescribed auto-CPAP 8-12 cm/H2O. He underwent an all night PAP titration on 6/19/2011(205#, CPAP 15 cm/H20). He is currently on CPAP 9-15 cm/H20.     Oximetry results were obtained for the date of 9/25/2017.  The patient was on a treatment of CPAP 9-15 cm. The study showed a valid recording time of 7 hours and 19 minutes with a 4% desaturation index of 4.9.  The basal oxygen saturation was 94.1% and the lowest SpO2 was 87-88%.  The patient spent 0.3 minutes below 88%. No significant hypoxemia on current CPAP treatment.    He feels the new CPAP is working well overall, sleep has been more challenging in general following his recent robotic assisted radical prostatectomy.    Reviewed a detailed download over the past week on auto-titrate CPAP 9-15 cm H2O.  Usage ranged from 2 to 7 hours.  AHI often elevated at over 10, but appears to be linked to times of excessive link and events are labeled as unknown.    Problem List:  Patient Active Problem List    Diagnosis Date Noted     Coronary artery disease involving native coronary artery of native heart without angina pectoris 04/15/2019     Priority: Medium     LAD stent placed 12/26/18       Atrial fibrillation with rapid ventricular response (H) 09/01/2017     Priority: Medium     Psoriasis 01/26/2017     Priority: Medium     Sicca syndrome (H) 04/02/2015     Priority: Medium     Sensorineural hearing loss 01/29/2015     Priority: Medium     Degeneration of intervertebral disc of lumbar region 04/12/2012     Priority: Medium     Horseshoe kidney  "03/20/2012     Priority: Medium     GSIELLE (obstructive sleep apnea) 03/25/2010     Priority: Medium     sleep study at [facility] in October 2010(AHI 26, lowest oxygen saturation was 83%) for excessive daytime sleepiness. He was prescribed auto-CPAP 8-12 cm/H2O.   He underwent an all night PAP titration on 6/19/2011(205#, CPAP 15 cm/H20).       Hypertension 03/17/2009     Priority: Medium     Gastroesophageal reflux disease 12/07/2006     Priority: Medium     Acquired hypothyroidism 12/07/2006     Priority: Medium          /54   Pulse 77   Ht 1.778 m (5' 10\")   Wt 86.1 kg (189 lb 12.8 oz)   SpO2 99%   BMI 27.23 kg/m      Impression/Plan:    1.)  Moderate GISELLE   - Suspect that new residual AHI on same CPAP auto-titrate 9-15 cm H2O settings is due to excessive mask leak.   - Will arrange mask fitting, review CPAP download in 1 month.      Narinder Edmonds will follow up in about 2 year(s).     Twenty-five minutes spent with patient, all of which were spent face-to-face counseling, consulting, coordinating plan of care.      Narinder Peterson MD, MD    CC:  Jeff Beltran  "

## 2020-07-14 ENCOUNTER — OFFICE VISIT (OUTPATIENT)
Dept: DERMATOLOGY | Facility: CLINIC | Age: 70
End: 2020-07-14
Payer: MEDICARE

## 2020-07-14 VITALS — SYSTOLIC BLOOD PRESSURE: 149 MMHG | DIASTOLIC BLOOD PRESSURE: 71 MMHG | HEART RATE: 56 BPM | OXYGEN SATURATION: 99 %

## 2020-07-14 DIAGNOSIS — L81.4 LENTIGO: ICD-10-CM

## 2020-07-14 DIAGNOSIS — L82.1 SEBORRHEIC KERATOSIS: ICD-10-CM

## 2020-07-14 DIAGNOSIS — D23.9 DERMAL NEVUS: ICD-10-CM

## 2020-07-14 DIAGNOSIS — L21.9 DERMATITIS, SEBORRHEIC: ICD-10-CM

## 2020-07-14 DIAGNOSIS — Z85.828 HISTORY OF SKIN CANCER: Primary | ICD-10-CM

## 2020-07-14 DIAGNOSIS — D18.01 ANGIOMA OF SKIN: ICD-10-CM

## 2020-07-14 DIAGNOSIS — L82.0 INFLAMED SEBORRHEIC KERATOSIS: ICD-10-CM

## 2020-07-14 PROCEDURE — 99214 OFFICE O/P EST MOD 30 MIN: CPT | Mod: 25 | Performed by: DERMATOLOGY

## 2020-07-14 PROCEDURE — 17110 DESTRUCTION B9 LES UP TO 14: CPT | Performed by: DERMATOLOGY

## 2020-07-14 RX ORDER — FLUOCINONIDE TOPICAL SOLUTION USP, 0.05% 0.5 MG/ML
SOLUTION TOPICAL 2 TIMES DAILY
Qty: 120 ML | Refills: 3 | Status: SHIPPED | OUTPATIENT
Start: 2020-07-14 | End: 2022-04-06

## 2020-07-14 NOTE — PROGRESS NOTES
Narinder Edmonds is a 69 year old year old male patient here today for itching spots on scalp and flank.   .  Patient states this has been present for a while.  Patient reports the following symptoms:  itching.  Patient reports the following previous treatments none.  These treatments did not work.  Patient reports the following modifying factors none.  Associated symptoms: none.  Patient has no other skin complaints today.  Remainder of the HPI, Meds, PMH, Allergies, FH, and SH was reviewed in chart.      Past Medical History:   Diagnosis Date     Squamous cell carcinoma        Past Surgical History:   Procedure Laterality Date     ESOPHAGOSCOPY, GASTROSCOPY, DUODENOSCOPY (EGD), COMBINED  4/9/2012    Procedure:COMBINED ESOPHAGOSCOPY, GASTROSCOPY, DUODENOSCOPY (EGD); Gastroscopy; Surgeon:DELMAR CAMPBELL; Location:[facility]        No family history on file.    Social History     Socioeconomic History     Marital status:      Spouse name: Not on file     Number of children: Not on file     Years of education: Not on file     Highest education level: Not on file   Occupational History     Not on file   Social Needs     Financial resource strain: Not on file     Food insecurity     Worry: Not on file     Inability: Not on file     Transportation needs     Medical: Not on file     Non-medical: Not on file   Tobacco Use     Smoking status: Never Smoker     Smokeless tobacco: Never Used   Substance and Sexual Activity     Alcohol use: Not on file     Drug use: Not on file     Sexual activity: Not on file   Lifestyle     Physical activity     Days per week: Not on file     Minutes per session: Not on file     Stress: Not on file   Relationships     Social connections     Talks on phone: Not on file     Gets together: Not on file     Attends Denominational service: Not on file     Active member of club or organization: Not on file     Attends meetings of clubs or organizations: Not on file     Relationship status: Not on  file     Intimate partner violence     Fear of current or ex partner: Not on file     Emotionally abused: Not on file     Physically abused: Not on file     Forced sexual activity: Not on file   Other Topics Concern     Not on file   Social History Narrative     Not on file       Outpatient Encounter Medications as of 7/14/2020   Medication Sig Dispense Refill     ACETAMINOPHEN PO Take  by mouth.       Ascorbic Acid (VITAMIN C PO) Take  by mouth.       aspirin 81 MG EC tablet Take 81 mg by mouth       atorvastatin (LIPITOR) 40 MG tablet Take 40 mg by mouth daily  0     Cholecalciferol (VITAMIN D3) 25 MCG (1000 UT) CAPS Take 1,000 Units by mouth       ciclopirox (LOPROX) 0.77 % cream Apply topically 2 times daily 90 g 3     clobetasol (TEMOVATE) 0.05 % external solution Apply sparingly to affected area twice daily on scalp. Do not apply to face. 100 mL 3     CLOBETASOL PROPIONATE 0.05 % EX SHAM AS PRESCRIBED       CLOBETASOL PROPIONATE POWD AS PRESCRIBED       Cyclobenzaprine HCl (FLEXERIL PO) Take  by mouth.       diclofenac (VOLTAREN XR) 100 MG 24 hr tablet Take 100 mg by mouth       diclofenac (VOLTAREN) 1 % topical gel Apply 1 Application topically       docusate sodium (COLACE) 100 MG capsule Take 100 mg by mouth       FELDENE 20 MG OR CAPS 1 CAPSULE ORALLY DAILY       fluocinonide (LIDEX) 0.05 % solution        Levothyroxine Sodium (SYNTHROID PO) Take  by mouth.       losartan (COZAAR) 25 MG tablet Take 37.5 mg by mouth       metoprolol (LOPRESSOR) 50 MG tablet Take 50 mg by mouth       MULTIVITAMIN/IRON OR TABS None Entered       nitroGLYcerin (NITROSTAT) 0.4 MG sublingual tablet Place 0.4 mg under the tongue       Pyrithione Zinc (DERMAZINC SPRAY EX) Externally apply  topically.       ranitidine (ZANTAC) 150 MG tablet TAKE 1 TABLET BY MOUTH TWICE DAILY AS NEEDED  2     rivaroxaban ANTICOAGULANT (XARELTO) 20 MG TABS tablet Take 20 mg by mouth       sotalol (BETAPACE) 80 MG tablet TAKE 1 TABLET BY MOUTH TWICE  DAILY BEFORE MEALS  2     tobramycin-dexamethasone (TOBRADEX) ophthalmic solution 1 drop every 4 hours (while awake).       No facility-administered encounter medications on file as of 7/14/2020.              Review Of Systems  Skin: As above  Eyes: negative  Ears/Nose/Throat: negative  Respiratory: No shortness of breath, dyspnea on exertion, cough, or hemoptysis  Cardiovascular: negative  Gastrointestinal: negative  Genitourinary: negative  Musculoskeletal: negative  Neurologic: negative  Psychiatric: negative  Hematologic/Lymphatic/Immunologic: negative  Endocrine: negative      O:   NAD, WDWN, Alert & Oriented, Mood & Affect wnl, Vitals stable   Here today alone   There were no vitals taken for this visit.   General appearance normal   Vitals stable   Alert, oriented and in no acute distress      Following lymph nodes palpated: Occipital, Cervical, Supraclavicular no lad   L flank inflamed seborrheic keratosis x5   Scalp faint red scaly patches     Stuck on papules and brown macules on trunk and ext   Red papules on trunk  Flesh colored papules on trunk     The remainder of the full exam was normal; the following areas were examined:  conjunctiva/lids, oral mucosa, neck, peripheral vascular system, abdomen, lymph nodes, digits/nails, eccrine and apocrine glands, scalp/hair, face, neck, chest, abdomen, buttocks, back, RUE, LUE, RLE, LLE       Eyes: Conjunctivae/lids:Normal     ENT: Lips, buccal mucosa, tongue: normal    MSK:Normal    Cardiovascular: peripheral edema none    Pulm: Breathing Normal    Lymph Nodes: No Head and Neck Lymphadenopathy     Neuro/Psych: Orientation:Alert and Orientedx3 ; Mood/Affect:normal       A/P:  1. Seborrheic keratosis, lentigo, angioma, dermal nevus, hx of non-melanoma skin cancer  2.flank inflamed seborrheic keratosis  LN2:  Treated with LN2 for 5s for 1-2 cycles. Warned risks of blistering, pain, pigment change, scarring, and incomplete resolution.  Advised patient to return if  lesions do not completely resolve.  Wound care sheet given.  3. Seb derm  Lidex sol prn     BENIGN LESIONS DISCUSSED WITH PATIENT:  I discussed the specifics of tumor, prognosis, and genetics of benign lesions.  I explained that treatment of these lesions would be purely cosmetic and not medically neccessary.  I discussed with patient different removal options including excision, cautery and /or laser.      Nature and genetics of benign skin lesions dicussed with patient.  Signs and Symptoms of skin cancer discussed with patient.  Patient encouraged to perform monthly skin exams.  UV precautions reviewed with patient.  Skin care regimen reviewed with patient: Eliminate harsh soaps, i.e. Dial, zest, irsih spring; Mild soaps such as Cetaphil or Dove sensitive skin, avoid hot or cold showers, aggressive use of emollients including vanicream, cetaphil or cerave discussed with patient.    Risks of non-melanoma skin cancer discussed with patient   Return to clinic 12 months

## 2020-07-14 NOTE — LETTER
7/14/2020         RE: Narinder Edmonds  [address] [facility] [address]        Dear Colleague,    Thank you for referring your patient, Narinder Edmonds, to the [facility]. Please see a copy of my visit note below.    Narinder Edmonds is a 69 year old year old male patient here today for itching spots on scalp and flank.   .  Patient states this has been present for a while.  Patient reports the following symptoms:  itching.  Patient reports the following previous treatments none.  These treatments did not work.  Patient reports the following modifying factors none.  Associated symptoms: none.  Patient has no other skin complaints today.  Remainder of the HPI, Meds, PMH, Allergies, FH, and SH was reviewed in chart.      Past Medical History:   Diagnosis Date     Squamous cell carcinoma        Past Surgical History:   Procedure Laterality Date     ESOPHAGOSCOPY, GASTROSCOPY, DUODENOSCOPY (EGD), COMBINED  4/9/2012    Procedure:COMBINED ESOPHAGOSCOPY, GASTROSCOPY, DUODENOSCOPY (EGD); Gastroscopy; Surgeon:DELMAR CAMPBELL; Location:[facility]        No family history on file.    Social History     Socioeconomic History     Marital status:      Spouse name: Not on file     Number of children: Not on file     Years of education: Not on file     Highest education level: Not on file   Occupational History     Not on file   Social Needs     Financial resource strain: Not on file     Food insecurity     Worry: Not on file     Inability: Not on file     Transportation needs     Medical: Not on file     Non-medical: Not on file   Tobacco Use     Smoking status: Never Smoker     Smokeless tobacco: Never Used   Substance and Sexual Activity     Alcohol use: Not on file     Drug use: Not on file     Sexual activity: Not on file   Lifestyle     Physical activity     Days per week: Not on file     Minutes per session: Not on file     Stress: Not on file   Relationships     Social connections     Talks  on phone: Not on file     Gets together: Not on file     Attends Jew service: Not on file     Active member of club or organization: Not on file     Attends meetings of clubs or organizations: Not on file     Relationship status: Not on file     Intimate partner violence     Fear of current or ex partner: Not on file     Emotionally abused: Not on file     Physically abused: Not on file     Forced sexual activity: Not on file   Other Topics Concern     Not on file   Social History Narrative     Not on file       Outpatient Encounter Medications as of 7/14/2020   Medication Sig Dispense Refill     ACETAMINOPHEN PO Take  by mouth.       Ascorbic Acid (VITAMIN C PO) Take  by mouth.       aspirin 81 MG EC tablet Take 81 mg by mouth       atorvastatin (LIPITOR) 40 MG tablet Take 40 mg by mouth daily  0     Cholecalciferol (VITAMIN D3) 25 MCG (1000 UT) CAPS Take 1,000 Units by mouth       ciclopirox (LOPROX) 0.77 % cream Apply topically 2 times daily 90 g 3     clobetasol (TEMOVATE) 0.05 % external solution Apply sparingly to affected area twice daily on scalp. Do not apply to face. 100 mL 3     CLOBETASOL PROPIONATE 0.05 % EX SHAM AS PRESCRIBED       CLOBETASOL PROPIONATE POWD AS PRESCRIBED       Cyclobenzaprine HCl (FLEXERIL PO) Take  by mouth.       diclofenac (VOLTAREN XR) 100 MG 24 hr tablet Take 100 mg by mouth       diclofenac (VOLTAREN) 1 % topical gel Apply 1 Application topically       docusate sodium (COLACE) 100 MG capsule Take 100 mg by mouth       FELDENE 20 MG OR CAPS 1 CAPSULE ORALLY DAILY       fluocinonide (LIDEX) 0.05 % solution        Levothyroxine Sodium (SYNTHROID PO) Take  by mouth.       losartan (COZAAR) 25 MG tablet Take 37.5 mg by mouth       metoprolol (LOPRESSOR) 50 MG tablet Take 50 mg by mouth       MULTIVITAMIN/IRON OR TABS None Entered       nitroGLYcerin (NITROSTAT) 0.4 MG sublingual tablet Place 0.4 mg under the tongue       Pyrithione Zinc (DERMAZINC SPRAY EX) Externally apply   topically.       ranitidine (ZANTAC) 150 MG tablet TAKE 1 TABLET BY MOUTH TWICE DAILY AS NEEDED  2     rivaroxaban ANTICOAGULANT (XARELTO) 20 MG TABS tablet Take 20 mg by mouth       sotalol (BETAPACE) 80 MG tablet TAKE 1 TABLET BY MOUTH TWICE DAILY BEFORE MEALS  2     tobramycin-dexamethasone (TOBRADEX) ophthalmic solution 1 drop every 4 hours (while awake).       No facility-administered encounter medications on file as of 7/14/2020.              Review Of Systems  Skin: As above  Eyes: negative  Ears/Nose/Throat: negative  Respiratory: No shortness of breath, dyspnea on exertion, cough, or hemoptysis  Cardiovascular: negative  Gastrointestinal: negative  Genitourinary: negative  Musculoskeletal: negative  Neurologic: negative  Psychiatric: negative  Hematologic/Lymphatic/Immunologic: negative  Endocrine: negative      O:   NAD, WDWN, Alert & Oriented, Mood & Affect wnl, Vitals stable   Here today alone   There were no vitals taken for this visit.   General appearance normal   Vitals stable   Alert, oriented and in no acute distress      Following lymph nodes palpated: Occipital, Cervical, Supraclavicular no lad   L flank inflamed seborrheic keratosis x5   Scalp faint red scaly patches     Stuck on papules and brown macules on trunk and ext   Red papules on trunk  Flesh colored papules on trunk     The remainder of the full exam was normal; the following areas were examined:  conjunctiva/lids, oral mucosa, neck, peripheral vascular system, abdomen, lymph nodes, digits/nails, eccrine and apocrine glands, scalp/hair, face, neck, chest, abdomen, buttocks, back, RUE, LUE, RLE, LLE       Eyes: Conjunctivae/lids:Normal     ENT: Lips, buccal mucosa, tongue: normal    MSK:Normal    Cardiovascular: peripheral edema none    Pulm: Breathing Normal    Lymph Nodes: No Head and Neck Lymphadenopathy     Neuro/Psych: Orientation:Alert and Orientedx3 ; Mood/Affect:normal       A/P:  1. Seborrheic keratosis, lentigo, angioma,  dermal nevus, hx of non-melanoma skin cancer  2.flank inflamed seborrheic keratosis  LN2:  Treated with LN2 for 5s for 1-2 cycles. Warned risks of blistering, pain, pigment change, scarring, and incomplete resolution.  Advised patient to return if lesions do not completely resolve.  Wound care sheet given.  3. Seb derm  Lidex sol prn     BENIGN LESIONS DISCUSSED WITH PATIENT:  I discussed the specifics of tumor, prognosis, and genetics of benign lesions.  I explained that treatment of these lesions would be purely cosmetic and not medically neccessary.  I discussed with patient different removal options including excision, cautery and /or laser.      Nature and genetics of benign skin lesions dicussed with patient.  Signs and Symptoms of skin cancer discussed with patient.  Patient encouraged to perform monthly skin exams.  UV precautions reviewed with patient.  Skin care regimen reviewed with patient: Eliminate harsh soaps, i.e. Dial, zest, irsih spring; Mild soaps such as Cetaphil or Dove sensitive skin, avoid hot or cold showers, aggressive use of emollients including vanicream, cetaphil or cerave discussed with patient.    Risks of non-melanoma skin cancer discussed with patient   Return to clinic 12 months      Again, thank you for allowing me to participate in the care of your patient.        Sincerely,        Ike Edmonds MD

## 2020-09-26 ENCOUNTER — NURSE TRIAGE (OUTPATIENT)
Dept: NURSING | Facility: CLINIC | Age: 70
End: 2020-09-26

## 2020-09-26 NOTE — TELEPHONE ENCOUNTER
S: Possible covid.  B: Calling about possible covid.  His symptoms are.    Fever 99.0-100.0 started 9/25 took tylenol    Rash on stomach     SOB when walking around block on 9/22 & 9/23    Shoulders ache    A: Advised to go on line and fill out [email or web address]    R: Reviewed care advise with Shaun and his wife.  Will complete [email or web address] form.    Georgia Gibson RN, [facility]      Reason for Disposition    [1] COVID-19 infection suspected by caller or triager AND [2] mild symptoms (cough, fever, or others) AND [3] no complications or SOB    Additional Information    Negative: SEVERE difficulty breathing (e.g., struggling for each breath, speaks in single words)    Negative: Difficult to awaken or acting confused (e.g., disoriented, slurred speech)    Negative: Bluish (or gray) lips or face now    Negative: Shock suspected (e.g., cold/pale/clammy skin, too weak to stand, low BP, rapid pulse)    Negative: Sounds like a life-threatening emergency to the triager    Negative: SEVERE or constant chest pain or pressure (Exception: mild central chest pain, present only when coughing)    Negative: MODERATE difficulty breathing (e.g., speaks in phrases, SOB even at rest, pulse 100-120)    Negative: Patient sounds very sick or weak to the triager    Negative: MILD difficulty breathing (e.g., minimal/no SOB at rest, SOB with walking, pulse <100)    Negative: Chest pain or pressure    Negative: Fever > 103 F (39.4 C)    Negative: [1] Fever > 101 F (38.3 C) AND [2] age > 60    Negative: [1] Fever > 100.0 F (37.8 C) AND [2] bedridden (e.g., nursing home patient, CVA, chronic illness, recovering from surgery)    Negative: HIGH RISK patient (e.g., age > 64 years, diabetes, heart or lung disease, weak immune system) (Exception: Has already been evaluated by healthcare provider and has no new or worsening symptoms)    Negative: Fever present > 3 days (72 hours)    Negative: [1] Fever returns after gone for over 24 hours AND [2]  symptoms worse or not improved    Negative: [1] Continuous (nonstop) coughing interferes with work or school AND [2] no improvement using cough treatment per protocol    Protocols used: CORONAVIRUS (COVID-19) DIAGNOSED OR [id number] 8.4.20    COVID 19 Nurse Triage Plan/Patient Instructions    Please be aware that novel coronavirus (COVID-19) may be circulating in the community. If you develop symptoms such as fever, cough, or SOB or if you have concerns about the presence of another infection including coronavirus (COVID-19), please contact your health care provider or visit [email or web address].     Disposition/Instructions    Virtual Visit with provider recommended. Reference Visit Selection Guide.    Thank you for taking steps to prevent the spread of this virus.  o Limit your contact with others.  o Wear a simple mask to cover your cough.  o Wash your hands well and often.    Resources    [facility]: About COVID-19: [email or web address]    CDC: What to Do If You're Sick: www.cdc.gov/coronavirus/2019-ncov/about/steps-when-sick.html    CDC: Ending Home Isolation: www.cdc.gov/coronavirus/2019-ncov/hcp/disposition-in-home-patients.html     CDC: Caring for Someone: www.cdc.gov/coronavirus/2019-ncov/if-you-are-sick/care-for-someone.html     [facility]: Interim Guidance for Hospital Discharge to Home: www.health.[facility].[place]/diseases/coronavirus/hcp/hospdischarge.pdf    [facility] clinical trials (COVID-19 research studies): [email or web address]-clinical-trials     Below are the COVID-19 hotlines at the [place] [facility] ([facility]). Interpreters are available.   o For health questions: Call [phone] or 1-983.569.7858 (7 a.m. to 7 p.m.)  o For questions about schools and childcare: Call [phone] or [phone] (7 a.m. to 7 p.m.)

## 2020-11-27 ENCOUNTER — TELEPHONE (OUTPATIENT)
Dept: SLEEP MEDICINE | Facility: CLINIC | Age: 70
End: 2020-11-27

## 2020-11-27 NOTE — TELEPHONE ENCOUNTER
PT CALLED TO STATE THAT HIS MACHINE LAST NIGHT GAVE AN ERROR MESSAGE OF OBSTRUCTION IN THE TUBING. PT STATES THAT HE ALSO IS GETTING RAIN OUT IN HIS MASK. HE STATES HE DID PUT THE MACHINE LOWER TO THE FLOOR AND THAT RAIN OUT HAS SEEMED TO DECREASE. RECOMMED HE TRY TURNING DOWN THE HUMIDITY AND INCREASING THE TUBE TEMPERATURE IF NEEDED AND TO CALL ME NEXT WEEK IF HE CONTINUES TO HAVE ISSUES.

## 2020-12-14 ENCOUNTER — TELEPHONE (OUTPATIENT)
Dept: SLEEP MEDICINE | Facility: CLINIC | Age: 70
End: 2020-12-14

## 2020-12-14 NOTE — TELEPHONE ENCOUNTER
STATED THAT HE IS WAKING UP WITH A LOT OF WATER IN HIS MASK. TALKED THE PT THROUGH HOW TO INCREASE THE TUBE TEMP AND DECREASE THE HUMIDITY. LET THE PT KNOW THAT IF THERE IS A LOT OF WATER IN THE TUBE THE MACHINE THINKS IT IS BLOCKED. ASKED THE PT TO TRY THIS OUT FOR A COUPLE OF NIGHTS AND REACH OUT IF IT DOES NOT HELP THE ISSUE.

## 2021-03-10 ENCOUNTER — OFFICE VISIT (OUTPATIENT)
Dept: DERMATOLOGY | Facility: CLINIC | Age: 71
End: 2021-03-10
Payer: MEDICARE

## 2021-03-10 VITALS — OXYGEN SATURATION: 99 % | SYSTOLIC BLOOD PRESSURE: 137 MMHG | HEART RATE: 62 BPM | DIASTOLIC BLOOD PRESSURE: 68 MMHG

## 2021-03-10 DIAGNOSIS — L72.0 EPIDERMAL CYST: Primary | ICD-10-CM

## 2021-03-10 PROCEDURE — 11422 EXC H-F-NK-SP B9+MARG 1.1-2: CPT | Performed by: DERMATOLOGY

## 2021-03-10 PROCEDURE — 12041 INTMD RPR N-HF/GENIT 2.5CM/<: CPT | Performed by: DERMATOLOGY

## 2021-03-10 PROCEDURE — 88331 PATH CONSLTJ SURG 1 BLK 1SPC: CPT | Performed by: DERMATOLOGY

## 2021-03-10 NOTE — NURSING NOTE
"Initial /68 (BP Location: Left arm, Patient Position: Sitting)   Pulse 62   SpO2 99%  Estimated body mass index is 27.23 kg/m  as calculated from the following:    Height as of 12/27/19: 1.778 m (5' 10\").    Weight as of 12/27/19: 86.1 kg (189 lb 12.8 oz). .      "

## 2021-03-10 NOTE — PATIENT INSTRUCTIONS
Sutured Wound Care     [facility]: [phone]    [facility]: [phone]    Posterior neck    ? No strenuous activity for 48 hours. Resume moderate activity in 48 hours. No heavy exercising until you are seen for follow up in one week.     ? Take Tylenol as needed for discomfort.                         ? Do not drink alcoholic beverages for 48 hours.     ? Keep the pressure bandage in place for 24 hours. If the bandage becomes blood tinged or loose, reinforce it with gauze and tape.        (Refer to the reverse side of this page for management of bleeding).    ? Remove pressure bandage in 24 hours (white gauze and tape)    ? Leave the flat bandage in place until your follow up appointment. (shiny)    ? Keep the bandage dry. Wash around it carefully.    ? If the tape becomes soiled or starts to come off, reinforce it with additional paper tape.    ? Do not smoke for 3 weeks; smoking is detrimental to wound healing.    ? It is normal to have swelling and bruising around the surgical site. The bruising will fade in approximately 10-14 days. Elevate the area to reduce swelling.    ? Numbness, itchiness and sensitivity to temperature changes can occur after surgery and may take up to 18 months to normalize.    POSSIBLE COMPLICATIONS    BLEEDIN. Leave the bandage in place.  2. Use tightly rolled up gauze or a cloth to apply direct pressure over the bandage for 20   minutes.  3. Reapply pressure for an additional 20 minutes if necessary  4. Call the office or go to the nearest emergency room if pressure fails to stop the bleeding.  5. Use additional gauze and tape to maintain pressure once the bleeding has stopped.    PAIN:    1. Post operative pain should slowly get better, never worse.  2. A severe increase in pain may indicate a problem. Call the office if this occurs.    In case of emergency phone:Dr Edmonds [phone]

## 2021-03-10 NOTE — LETTER
3/10/2021         RE: Narinder Edmonds  [address] [facility] [address]        Dear Colleague,    Thank you for referring your patient, Narinder Edmonds, to the [facility]. Please see a copy of my visit note below.    Narinder Edmonds is an extremely pleasant 70 year old year old male patient here today for tender spot on right post neck.   .   Patient states this has been present for a while.  Patient reports the following symptoms:  tender.  Patient reports the following previous treatments none.  These treatments did not work.  Patient reports the following modifying factors none.  Associated symptoms: none.  Patient has no other skin complaints today.  Remainder of the HPI, Meds, PMH, Allergies, FH, and SH was reviewed in chart.      Past Medical History:   Diagnosis Date     Squamous cell carcinoma        Past Surgical History:   Procedure Laterality Date     ESOPHAGOSCOPY, GASTROSCOPY, DUODENOSCOPY (EGD), COMBINED  4/9/2012    Procedure:COMBINED ESOPHAGOSCOPY, GASTROSCOPY, DUODENOSCOPY (EGD); Gastroscopy; Surgeon:DELMAR CAMPBELL; Location:[facility]        History reviewed. No pertinent family history.    Social History     Socioeconomic History     Marital status:      Spouse name: Not on file     Number of children: Not on file     Years of education: Not on file     Highest education level: Not on file   Occupational History     Not on file   Social Needs     Financial resource strain: Not on file     Food insecurity     Worry: Not on file     Inability: Not on file     Transportation needs     Medical: Not on file     Non-medical: Not on file   Tobacco Use     Smoking status: Never Smoker     Smokeless tobacco: Never Used   Substance and Sexual Activity     Alcohol use: Not on file     Drug use: Not on file     Sexual activity: Not on file   Lifestyle     Physical activity     Days per week: Not on file     Minutes per session: Not on file     Stress: Not on file    Relationships     Social connections     Talks on phone: Not on file     Gets together: Not on file     Attends Gnosticism service: Not on file     Active member of club or organization: Not on file     Attends meetings of clubs or organizations: Not on file     Relationship status: Not on file     Intimate partner violence     Fear of current or ex partner: Not on file     Emotionally abused: Not on file     Physically abused: Not on file     Forced sexual activity: Not on file   Other Topics Concern     Not on file   Social History Narrative     Not on file       Outpatient Encounter Medications as of 3/10/2021   Medication Sig Dispense Refill     ACETAMINOPHEN PO Take  by mouth.       Ascorbic Acid (VITAMIN C PO) Take  by mouth.       aspirin 81 MG EC tablet Take 81 mg by mouth       atorvastatin (LIPITOR) 40 MG tablet Take 40 mg by mouth daily  0     Cholecalciferol (VITAMIN D3) 25 MCG (1000 UT) CAPS Take 1,000 Units by mouth       clobetasol (TEMOVATE) 0.05 % external solution Apply sparingly to affected area twice daily on scalp. Do not apply to face. 100 mL 3     CLOBETASOL PROPIONATE 0.05 % EX SHAM AS PRESCRIBED       CLOBETASOL PROPIONATE POWD AS PRESCRIBED       Cyclobenzaprine HCl (FLEXERIL PO) Take  by mouth.       diclofenac (VOLTAREN XR) 100 MG 24 hr tablet Take 100 mg by mouth       diclofenac (VOLTAREN) 1 % topical gel Apply 1 Application topically       docusate sodium (COLACE) 100 MG capsule Take 100 mg by mouth       FELDENE 20 MG OR CAPS 1 CAPSULE ORALLY DAILY       fluocinonide (LIDEX) 0.05 % external solution Apply topically 2 times daily 120 mL 3     fluocinonide (LIDEX) 0.05 % solution        Levothyroxine Sodium (SYNTHROID PO) Take  by mouth.       losartan (COZAAR) 25 MG tablet Take 37.5 mg by mouth       metoprolol (LOPRESSOR) 50 MG tablet Take 50 mg by mouth       MULTIVITAMIN/IRON OR TABS None Entered       Pyrithione Zinc (DERMAZINC SPRAY EX) Externally apply  topically.        ranitidine (ZANTAC) 150 MG tablet TAKE 1 TABLET BY MOUTH TWICE DAILY AS NEEDED  2     rivaroxaban ANTICOAGULANT (XARELTO) 20 MG TABS tablet Take 20 mg by mouth       sotalol (BETAPACE) 80 MG tablet TAKE 1 TABLET BY MOUTH TWICE DAILY BEFORE MEALS  2     tobramycin-dexamethasone (TOBRADEX) ophthalmic solution 1 drop every 4 hours (while awake).       ciclopirox (LOPROX) 0.77 % cream Apply topically 2 times daily (Patient not taking: Reported on 7/14/2020) 90 g 3     nitroGLYcerin (NITROSTAT) 0.4 MG sublingual tablet Place 0.4 mg under the tongue       No facility-administered encounter medications on file as of 3/10/2021.              Review Of Systems  Skin: As above  Eyes: negative  Ears/Nose/Throat: negative  Respiratory: No shortness of breath, dyspnea on exertion, cough, or hemoptysis  Cardiovascular: negative  Gastrointestinal: negative  Genitourinary: negative  Musculoskeletal: negative  Neurologic: negative  Psychiatric: negative  Hematologic/Lymphatic/Immunologic: negative  Endocrine: negative      O:   NAD, WDWN, Alert & Oriented, Mood & Affect wnl, Vitals stable   Here today alone   /68 (BP Location: Left arm, Patient Position: Sitting)   Pulse 62   SpO2 99%    General appearance normal   Vitals stable   Alert, oriented and in no acute distress     R post neck 1.3cm nodule with comedone      Eyes: Conjunctivae/lids:Normal     ENT: Lips, buccal mucosa, tongue: normal    MSK:Normal    Cardiovascular: peripheral edema none    Pulm: Breathing Normal    Neuro/Psych: Orientation:Alert and Orientedx3 ; Mood/Affect:normal       MICRO:   R post neck: cyst lined by stratified squamous epithelium with epidermal keratinization   A/P:  1. Epidermal cyst   It was a pleasure speaking to Narinder Edmonds today.    Nature of benign skin lesions dicussed with patient.  He would like revmoed  EXCISION OF CYST AND INT: After thorough discussion of PGACAC, consent obtained, anesthesia and prep, the margins of the cyst  were identified and an elliptical incision was made encompassing the cyst. The incisions were made through the skin and down to and including the subcutaneous tissue. The cyst was removed en bloc and submitted for permanent pathologic review. hemostasis was achieved. The wound edges were then closed in a layered fashion, being careful not to leave any dead space. Postoperative length was 1.9 cm.   EBL minimal; complications none; wound care routine. The patient was discharged in good condition and will return in one week for wound evaluation.        Again, thank you for allowing me to participate in the care of your patient.        Sincerely,        Ike Edmonds MD

## 2021-03-10 NOTE — PROGRESS NOTES
Narinder Edmonds is an extremely pleasant 70 year old year old male patient here today for tender spot on right post neck.   .   Patient states this has been present for a while.  Patient reports the following symptoms:  tender.  Patient reports the following previous treatments none.  These treatments did not work.  Patient reports the following modifying factors none.  Associated symptoms: none.  Patient has no other skin complaints today.  Remainder of the HPI, Meds, PMH, Allergies, FH, and SH was reviewed in chart.      Past Medical History:   Diagnosis Date     Squamous cell carcinoma        Past Surgical History:   Procedure Laterality Date     ESOPHAGOSCOPY, GASTROSCOPY, DUODENOSCOPY (EGD), COMBINED  4/9/2012    Procedure:COMBINED ESOPHAGOSCOPY, GASTROSCOPY, DUODENOSCOPY (EGD); Gastroscopy; Surgeon:DELMAR CAMPBELL; Location:[facility]        History reviewed. No pertinent family history.    Social History     Socioeconomic History     Marital status:      Spouse name: Not on file     Number of children: Not on file     Years of education: Not on file     Highest education level: Not on file   Occupational History     Not on file   Social Needs     Financial resource strain: Not on file     Food insecurity     Worry: Not on file     Inability: Not on file     Transportation needs     Medical: Not on file     Non-medical: Not on file   Tobacco Use     Smoking status: Never Smoker     Smokeless tobacco: Never Used   Substance and Sexual Activity     Alcohol use: Not on file     Drug use: Not on file     Sexual activity: Not on file   Lifestyle     Physical activity     Days per week: Not on file     Minutes per session: Not on file     Stress: Not on file   Relationships     Social connections     Talks on phone: Not on file     Gets together: Not on file     Attends [employer] service: Not on file     Active member of club or organization: Not on file     Attends meetings of clubs or organizations: Not on  file     Relationship status: Not on file     Intimate partner violence     Fear of current or ex partner: Not on file     Emotionally abused: Not on file     Physically abused: Not on file     Forced sexual activity: Not on file   Other Topics Concern     Not on file   Social History Narrative     Not on file       Outpatient Encounter Medications as of 3/10/2021   Medication Sig Dispense Refill     ACETAMINOPHEN PO Take  by mouth.       Ascorbic Acid (VITAMIN C PO) Take  by mouth.       aspirin 81 MG EC tablet Take 81 mg by mouth       atorvastatin (LIPITOR) 40 MG tablet Take 40 mg by mouth daily  0     Cholecalciferol (VITAMIN D3) 25 MCG (1000 UT) CAPS Take 1,000 Units by mouth       clobetasol (TEMOVATE) 0.05 % external solution Apply sparingly to affected area twice daily on scalp. Do not apply to face. 100 mL 3     CLOBETASOL PROPIONATE 0.05 % EX SHAM AS PRESCRIBED       CLOBETASOL PROPIONATE POWD AS PRESCRIBED       Cyclobenzaprine HCl (FLEXERIL PO) Take  by mouth.       diclofenac (VOLTAREN XR) 100 MG 24 hr tablet Take 100 mg by mouth       diclofenac (VOLTAREN) 1 % topical gel Apply 1 Application topically       docusate sodium (COLACE) 100 MG capsule Take 100 mg by mouth       FELDENE 20 MG OR CAPS 1 CAPSULE ORALLY DAILY       fluocinonide (LIDEX) 0.05 % external solution Apply topically 2 times daily 120 mL 3     fluocinonide (LIDEX) 0.05 % solution        Levothyroxine Sodium (SYNTHROID PO) Take  by mouth.       losartan (COZAAR) 25 MG tablet Take 37.5 mg by mouth       metoprolol (LOPRESSOR) 50 MG tablet Take 50 mg by mouth       MULTIVITAMIN/IRON OR TABS None Entered       Pyrithione Zinc (DERMAZINC SPRAY EX) Externally apply  topically.       ranitidine (ZANTAC) 150 MG tablet TAKE 1 TABLET BY MOUTH TWICE DAILY AS NEEDED  2     rivaroxaban ANTICOAGULANT (XARELTO) 20 MG TABS tablet Take 20 mg by mouth       sotalol (BETAPACE) 80 MG tablet TAKE 1 TABLET BY MOUTH TWICE DAILY BEFORE MEALS  2      tobramycin-dexamethasone (TOBRADEX) ophthalmic solution 1 drop every 4 hours (while awake).       ciclopirox (LOPROX) 0.77 % cream Apply topically 2 times daily (Patient not taking: Reported on 7/14/2020) 90 g 3     nitroGLYcerin (NITROSTAT) 0.4 MG sublingual tablet Place 0.4 mg under the tongue       No facility-administered encounter medications on file as of 3/10/2021.              Review Of Systems  Skin: As above  Eyes: negative  Ears/Nose/Throat: negative  Respiratory: No shortness of breath, dyspnea on exertion, cough, or hemoptysis  Cardiovascular: negative  Gastrointestinal: negative  Genitourinary: negative  Musculoskeletal: negative  Neurologic: negative  Psychiatric: negative  Hematologic/Lymphatic/Immunologic: negative  Endocrine: negative      O:   NAD, WDWN, Alert & Oriented, Mood & Affect wnl, Vitals stable   Here today alone   /68 (BP Location: Left arm, Patient Position: Sitting)   Pulse 62   SpO2 99%    General appearance normal   Vitals stable   Alert, oriented and in no acute distress     R post neck 1.3cm nodule with comedone      Eyes: Conjunctivae/lids:Normal     ENT: Lips, buccal mucosa, tongue: normal    MSK:Normal    Cardiovascular: peripheral edema none    Pulm: Breathing Normal    Neuro/Psych: Orientation:Alert and Orientedx3 ; Mood/Affect:normal       MICRO:   R post neck: cyst lined by stratified squamous epithelium with epidermal keratinization   A/P:  1. Epidermal cyst   It was a pleasure speaking to Narinder Edmnods today.    Nature of benign skin lesions dicussed with patient.  He would like revmoed  EXCISION OF CYST AND INT: After thorough discussion of PGACAC, consent obtained, anesthesia and prep, the margins of the cyst were identified and an elliptical incision was made encompassing the cyst. The incisions were made through the skin and down to and including the subcutaneous tissue. The cyst was removed en bloc and submitted for permanent pathologic review. hemostasis  was achieved. The wound edges were then closed in a layered fashion, being careful not to leave any dead space. Postoperative length was 1.9 cm.   EBL minimal; complications none; wound care routine. The patient was discharged in good condition and will return in one week for wound evaluation.

## 2021-03-17 ENCOUNTER — OFFICE VISIT (OUTPATIENT)
Dept: DERMATOLOGY | Facility: CLINIC | Age: 71
End: 2021-03-17
Payer: MEDICARE

## 2021-03-17 DIAGNOSIS — Z48.01 ENCOUNTER FOR CHANGE OR REMOVAL OF SURGICAL WOUND DRESSING: Primary | ICD-10-CM

## 2021-03-17 PROCEDURE — 99207 PR NO CHARGE NURSE ONLY: CPT

## 2021-03-17 NOTE — PATIENT INSTRUCTIONS

## 2021-03-17 NOTE — PROGRESS NOTES
Pt returned to clinic for post surgery 1 week follow up bandage change. Pt has no complaints, denies pain. Bandage removed from posterior neck, area cleansed with normal saline. Site is healing and wound edges approximating well. Reapplied new steri strips and paper tape.    Advised to watch for signs/sx of infection; spreading redness, drainage, odor, fever. Call or report promptly to clinic. Pt given written instructions and informed to rtc as needed. Patient verbalized understanding.

## 2021-03-24 ENCOUNTER — TELEPHONE (OUTPATIENT)
Dept: DERMATOLOGY | Facility: CLINIC | Age: 71
End: 2021-03-24

## 2021-03-24 NOTE — TELEPHONE ENCOUNTER
Reason for Call:  Other call back    Detailed comments: Pt states he was in last week - had procedure 2 weeks ago - bandages were changed.  Now changed bandages at home and there is some tape across the skin - does he need to remove this as well?    Phone Number Patient can be reached at: Home number on file [phone] (home)    Best Time:     Can we leave a detailed message on this number? YES    Call taken on 3/24/2021 at 10:14 AM by Libia Hahn

## 2021-03-24 NOTE — TELEPHONE ENCOUNTER
Reviewed [facility] wound care. No further questions  Incision healing well per report    Jenny LIRIANO RN   [facility]

## 2021-05-26 ENCOUNTER — RECORDS - HEALTHEAST (OUTPATIENT)
Dept: ADMINISTRATIVE | Facility: CLINIC | Age: 71
End: 2021-05-26

## 2022-03-30 ENCOUNTER — TELEPHONE (OUTPATIENT)
Dept: DERMATOLOGY | Facility: CLINIC | Age: 72
End: 2022-03-30

## 2022-03-30 ENCOUNTER — OFFICE VISIT (OUTPATIENT)
Dept: DERMATOLOGY | Facility: CLINIC | Age: 72
End: 2022-03-30
Payer: MEDICARE

## 2022-03-30 VITALS — OXYGEN SATURATION: 98 % | DIASTOLIC BLOOD PRESSURE: 69 MMHG | HEART RATE: 64 BPM | SYSTOLIC BLOOD PRESSURE: 163 MMHG

## 2022-03-30 DIAGNOSIS — L82.0 INFLAMED SEBORRHEIC KERATOSIS: ICD-10-CM

## 2022-03-30 DIAGNOSIS — L57.0 AK (ACTINIC KERATOSIS): ICD-10-CM

## 2022-03-30 DIAGNOSIS — Z85.828 HISTORY OF SKIN CANCER: ICD-10-CM

## 2022-03-30 DIAGNOSIS — D18.01 ANGIOMA OF SKIN: ICD-10-CM

## 2022-03-30 DIAGNOSIS — L82.1 SEBORRHEIC KERATOSIS: ICD-10-CM

## 2022-03-30 DIAGNOSIS — L81.4 LENTIGO: Primary | ICD-10-CM

## 2022-03-30 DIAGNOSIS — D23.9 DERMAL NEVUS: ICD-10-CM

## 2022-03-30 PROCEDURE — 17110 DESTRUCTION B9 LES UP TO 14: CPT | Performed by: DERMATOLOGY

## 2022-03-30 PROCEDURE — 99213 OFFICE O/P EST LOW 20 MIN: CPT | Mod: 25 | Performed by: DERMATOLOGY

## 2022-03-30 PROCEDURE — 88331 PATH CONSLTJ SURG 1 BLK 1SPC: CPT | Performed by: DERMATOLOGY

## 2022-03-30 PROCEDURE — 11102 TANGNTL BX SKIN SINGLE LES: CPT | Mod: 59 | Performed by: DERMATOLOGY

## 2022-03-30 RX ORDER — FAMOTIDINE 20 MG/1
TABLET, FILM COATED ORAL
COMMUNITY
Start: 2021-12-05

## 2022-03-30 NOTE — TELEPHONE ENCOUNTER
----- Message from Ike Edmonds MD sent at 3/30/2022 12:53 PM CDT -----  Mid chest actinic keratosis cryo

## 2022-03-30 NOTE — PROGRESS NOTES
Narinder Edmonds is an extremely pleasant 71 year old year old male patient here today for itching spot on right cheek and spot on chest.   .   Patient states this has been present for a while on chest.  Patient reports the following symptoms:  bleeding.  Patient reports the following previous treatments none.  These treatments did not work.  Patient reports the following modifying factors none.  Associated symptoms: none.  Patient has no other skin complaints today.  Remainder of the HPI, Meds, PMH, Allergies, FH, and SH was reviewed in chart.      Past Medical History:   Diagnosis Date     Squamous cell carcinoma        Past Surgical History:   Procedure Laterality Date     ESOPHAGOSCOPY, GASTROSCOPY, DUODENOSCOPY (EGD), COMBINED  4/9/2012    Procedure:COMBINED ESOPHAGOSCOPY, GASTROSCOPY, DUODENOSCOPY (EGD); Gastroscopy; Surgeon:DELMAR CAMPBELL; Location:[facility]        History reviewed. No pertinent family history.    Social History     Socioeconomic History     Marital status:      Spouse name: Not on file     Number of children: Not on file     Years of education: Not on file     Highest education level: Not on file   Occupational History     Not on file   Tobacco Use     Smoking status: Never Smoker     Smokeless tobacco: Never Used   Substance and Sexual Activity     Alcohol use: Not on file     Drug use: Not on file     Sexual activity: Not on file   Other Topics Concern     Not on file   Social History Narrative     Not on file     Social Determinants of Health     Financial Resource Strain: Not on file   Food Insecurity: Not on file   Transportation Needs: Not on file   Physical Activity: Not on file   Stress: Not on file   Social Connections: Not on file   Intimate Partner Violence: Not on file   Housing Stability: Not on file       Outpatient Encounter Medications as of 3/30/2022   Medication Sig Dispense Refill     ACETAMINOPHEN PO Take  by mouth.       apixaban ANTICOAGULANT (ELIQUIS) 5 MG tablet  Take 5 mg by mouth       Ascorbic Acid (VITAMIN C PO) Take  by mouth.       atorvastatin (LIPITOR) 40 MG tablet Take 40 mg by mouth daily  0     Cholecalciferol (VITAMIN D3) 25 MCG (1000 UT) CAPS Take 1,000 Units by mouth       clobetasol (TEMOVATE) 0.05 % external solution Apply sparingly to affected area twice daily on scalp. Do not apply to face. 100 mL 3     Cyclobenzaprine HCl (FLEXERIL PO) Take  by mouth.       diclofenac (VOLTAREN XR) 100 MG 24 hr tablet Take 100 mg by mouth       diclofenac (VOLTAREN) 1 % topical gel Apply 1 Application topically       docusate sodium (COLACE) 100 MG capsule Take 100 mg by mouth       famotidine (PEPCID) 20 MG tablet famotidine 20 mg tablet       FELDENE 20 MG OR CAPS 1 CAPSULE ORALLY DAILY       fluocinonide (LIDEX) 0.05 % external solution Apply topically 2 times daily 120 mL 3     fluocinonide (LIDEX) 0.05 % solution        Levothyroxine Sodium (SYNTHROID PO) Take  by mouth.       losartan (COZAAR) 25 MG tablet Take 50 mg by mouth        MULTIVITAMIN/IRON OR TABS None Entered       nitroGLYcerin (NITROSTAT) 0.4 MG sublingual tablet Place 0.4 mg under the tongue       Pyrithione Zinc (DERMAZINC SPRAY EX) Externally apply  topically.       sotalol (BETAPACE) 80 MG tablet TAKE 1 TABLET BY MOUTH TWICE DAILY BEFORE MEALS  2     tobramycin-dexamethasone (TOBRADEX) ophthalmic solution 1 drop every 4 hours (while awake).       aspirin 81 MG EC tablet Take 81 mg by mouth (Patient not taking: Reported on 3/30/2022)       ciclopirox (LOPROX) 0.77 % cream Apply topically 2 times daily (Patient not taking: Reported on 7/14/2020) 90 g 3     CLOBETASOL PROPIONATE 0.05 % EX SHAM AS PRESCRIBED (Patient not taking: No sig reported)       CLOBETASOL PROPIONATE POWD AS PRESCRIBED (Patient not taking: No sig reported)       metoprolol (LOPRESSOR) 50 MG tablet Take 50 mg by mouth (Patient not taking: Reported on 3/30/2022)       ranitidine (ZANTAC) 150 MG tablet TAKE 1 TABLET BY MOUTH TWICE  DAILY AS NEEDED (Patient not taking: Reported on 3/30/2022)  2     rivaroxaban ANTICOAGULANT (XARELTO) 20 MG TABS tablet Take 20 mg by mouth (Patient not taking: Reported on 3/30/2022)       No facility-administered encounter medications on file as of 3/30/2022.             O:   NAD, WDWN, Alert & Oriented, Mood & Affect wnl, Vitals stable   Here today alone   BP (!) 163/69 (BP Location: Right arm, Cuff Size: Adult Large)   Pulse 64   SpO2 98%    General appearance normal   Vitals stable   Alert, oriented and in no acute distress      Following lymph nodes palpated: Occipital, Cervical, Supraclavicular no lad  Mid chest 2cm red plaque  R medial cheek inflamed seborrheic keratosis      Stuck on papules and brown macules on trunk and ext   Red papules on trunk  Flesh colored papules on trunk     The remainder of the full exam was normal; the following areas were examined:  conjunctiva/lids, oral mucosa, neck, peripheral vascular system, abdomen, lymph nodes, digits/nails, eccrine and apocrine glands, scalp/hair, face, neck, chest, abdomen, buttocks, back, RUE, LUE, RLE, LLE       Eyes: Conjunctivae/lids:Normal     ENT: Lips, buccal mucosa, tongue: normal    MSK:Normal    Cardiovascular: peripheral edema none    Pulm: Breathing Normal    Lymph Nodes: No Head and Neck Lymphadenopathy     Neuro/Psych: Orientation:Alert and Orientedx3 ; Mood/Affect:normal       MICRO:   Mid chest:There is hyperkeratosis and focal parakeratosis of the epidermis, overlying atypical keratinocytes,  by areas of orthokeratosis, there are scattered basal atypical keratinocytes: with varying degrees of overlying loss of maturation, hyperchromatism, pleomorphism, increased and abnormal mitoses, dyskeratosis.  The dermis shows a variable inflammatory infiltrate.   A/P:  1. Seborrheic keratosis, lentigo, angioma, dermal nevus, hx of non-melanoma skin cancer   2. R medial cheek inflamed seborrheic keratosis   LN2:  Treated with LN2 for 5s  for 1-2 cycles. Warned risks of blistering, pain, pigment change, scarring, and incomplete resolution.  Advised patient to return if lesions do not completely resolve.  Wound care sheet given.  3. Mid chest r/o basal cell carcinoma   TANGENTIAL BIOPSY IN HOUSE:  After consent, anesthesia with LEC and prep, tangential excision performed and dx above confirmed with frozen section histology.  No complications and routine wound care.  Patient is not on  anticoagulants and risk of bleeding discussed with patient.       I have personally reviewed all specimens and/or slides and used them with my medical judgement to determine or confirm the final diagnosis.     Patient told result actinic keratosis cryo .      It was a pleasure speaking to Narinder Edmonds today.  Previous clinic notes and pertinent laboratory tests were reviewed prior to Narinder Edmonds's visit.  BENIGN LESIONS DISCUSSED WITH PATIENT:  I discussed the specifics of tumor, prognosis, and genetics of benign lesions.  I explained that treatment of these lesions would be purely cosmetic and not medically neccessary.  I discussed with patient different removal options including excision, cautery and /or laser.      Nature and genetics of benign skin lesions dicussed with patient.  Signs and Symptoms of skin cancer discussed with patient.  Patient encouraged to perform monthly skin exams.  UV precautions reviewed with patient.  Risks of non-melanoma skin cancer discussed with patient   Return to clinic 12 months

## 2022-03-30 NOTE — LETTER
3/30/2022         RE: Narinder Edmonds  [address] [facility] [address]        Dear Colleague,    Thank you for referring your patient, Narinder Edmonds, to the [facility]. Please see a copy of my visit note below.    Narinder Edmonds is an extremely pleasant 71 year old year old male patient here today for itching spot on right cheek and spot on chest.   .   Patient states this has been present for a while on chest.  Patient reports the following symptoms:  bleeding.  Patient reports the following previous treatments none.  These treatments did not work.  Patient reports the following modifying factors none.  Associated symptoms: none.  Patient has no other skin complaints today.  Remainder of the HPI, Meds, PMH, Allergies, FH, and SH was reviewed in chart.      Past Medical History:   Diagnosis Date     Squamous cell carcinoma        Past Surgical History:   Procedure Laterality Date     ESOPHAGOSCOPY, GASTROSCOPY, DUODENOSCOPY (EGD), COMBINED  4/9/2012    Procedure:COMBINED ESOPHAGOSCOPY, GASTROSCOPY, DUODENOSCOPY (EGD); Gastroscopy; Surgeon:DELMAR CAMPBELL; Location:[facility]        History reviewed. No pertinent family history.    Social History     Socioeconomic History     Marital status:      Spouse name: Not on file     Number of children: Not on file     Years of education: Not on file     Highest education level: Not on file   Occupational History     Not on file   Tobacco Use     Smoking status: Never Smoker     Smokeless tobacco: Never Used   Substance and Sexual Activity     Alcohol use: Not on file     Drug use: Not on file     Sexual activity: Not on file   Other Topics Concern     Not on file   Social History Narrative     Not on file     Social Determinants of Health     Financial Resource Strain: Not on file   Food Insecurity: Not on file   Transportation Needs: Not on file   Physical Activity: Not on file   Stress: Not on file   Social Connections: Not on  file   Intimate Partner Violence: Not on file   Housing Stability: Not on file       Outpatient Encounter Medications as of 3/30/2022   Medication Sig Dispense Refill     ACETAMINOPHEN PO Take  by mouth.       apixaban ANTICOAGULANT (ELIQUIS) 5 MG tablet Take 5 mg by mouth       Ascorbic Acid (VITAMIN C PO) Take  by mouth.       atorvastatin (LIPITOR) 40 MG tablet Take 40 mg by mouth daily  0     Cholecalciferol (VITAMIN D3) 25 MCG (1000 UT) CAPS Take 1,000 Units by mouth       clobetasol (TEMOVATE) 0.05 % external solution Apply sparingly to affected area twice daily on scalp. Do not apply to face. 100 mL 3     Cyclobenzaprine HCl (FLEXERIL PO) Take  by mouth.       diclofenac (VOLTAREN XR) 100 MG 24 hr tablet Take 100 mg by mouth       diclofenac (VOLTAREN) 1 % topical gel Apply 1 Application topically       docusate sodium (COLACE) 100 MG capsule Take 100 mg by mouth       famotidine (PEPCID) 20 MG tablet famotidine 20 mg tablet       FELDENE 20 MG OR CAPS 1 CAPSULE ORALLY DAILY       fluocinonide (LIDEX) 0.05 % external solution Apply topically 2 times daily 120 mL 3     fluocinonide (LIDEX) 0.05 % solution        Levothyroxine Sodium (SYNTHROID PO) Take  by mouth.       losartan (COZAAR) 25 MG tablet Take 50 mg by mouth        MULTIVITAMIN/IRON OR TABS None Entered       nitroGLYcerin (NITROSTAT) 0.4 MG sublingual tablet Place 0.4 mg under the tongue       Pyrithione Zinc (DERMAZINC SPRAY EX) Externally apply  topically.       sotalol (BETAPACE) 80 MG tablet TAKE 1 TABLET BY MOUTH TWICE DAILY BEFORE MEALS  2     tobramycin-dexamethasone (TOBRADEX) ophthalmic solution 1 drop every 4 hours (while awake).       aspirin 81 MG EC tablet Take 81 mg by mouth (Patient not taking: Reported on 3/30/2022)       ciclopirox (LOPROX) 0.77 % cream Apply topically 2 times daily (Patient not taking: Reported on 7/14/2020) 90 g 3     CLOBETASOL PROPIONATE 0.05 % EX SHAM AS PRESCRIBED (Patient not taking: No sig reported)        CLOBETASOL PROPIONATE POWD AS PRESCRIBED (Patient not taking: No sig reported)       metoprolol (LOPRESSOR) 50 MG tablet Take 50 mg by mouth (Patient not taking: Reported on 3/30/2022)       ranitidine (ZANTAC) 150 MG tablet TAKE 1 TABLET BY MOUTH TWICE DAILY AS NEEDED (Patient not taking: Reported on 3/30/2022)  2     rivaroxaban ANTICOAGULANT (XARELTO) 20 MG TABS tablet Take 20 mg by mouth (Patient not taking: Reported on 3/30/2022)       No facility-administered encounter medications on file as of 3/30/2022.             O:   NAD, WDWN, Alert & Oriented, Mood & Affect wnl, Vitals stable   Here today alone   BP (!) 163/69 (BP Location: Right arm, Cuff Size: Adult Large)   Pulse 64   SpO2 98%    General appearance normal   Vitals stable   Alert, oriented and in no acute distress      Following lymph nodes palpated: Occipital, Cervical, Supraclavicular no lad  Mid chest 2cm red plaque  R medial cheek inflamed seborrheic keratosis      Stuck on papules and brown macules on trunk and ext   Red papules on trunk  Flesh colored papules on trunk     The remainder of the full exam was normal; the following areas were examined:  conjunctiva/lids, oral mucosa, neck, peripheral vascular system, abdomen, lymph nodes, digits/nails, eccrine and apocrine glands, scalp/hair, face, neck, chest, abdomen, buttocks, back, RUE, LUE, RLE, LLE       Eyes: Conjunctivae/lids:Normal     ENT: Lips, buccal mucosa, tongue: normal    MSK:Normal    Cardiovascular: peripheral edema none    Pulm: Breathing Normal    Lymph Nodes: No Head and Neck Lymphadenopathy     Neuro/Psych: Orientation:Alert and Orientedx3 ; Mood/Affect:normal       MICRO:   Mid chest:There is hyperkeratosis and focal parakeratosis of the epidermis, overlying atypical keratinocytes,  by areas of orthokeratosis, there are scattered basal atypical keratinocytes: with varying degrees of overlying loss of maturation, hyperchromatism, pleomorphism, increased and abnormal  mitoses, dyskeratosis.  The dermis shows a variable inflammatory infiltrate.   A/P:  1. Seborrheic keratosis, lentigo, angioma, dermal nevus, hx of non-melanoma skin cancer   2. R medial cheek inflamed seborrheic keratosis   LN2:  Treated with LN2 for 5s for 1-2 cycles. Warned risks of blistering, pain, pigment change, scarring, and incomplete resolution.  Advised patient to return if lesions do not completely resolve.  Wound care sheet given.  3. Mid chest r/o basal cell carcinoma   TANGENTIAL BIOPSY IN HOUSE:  After consent, anesthesia with LEC and prep, tangential excision performed and dx above confirmed with frozen section histology.  No complications and routine wound care.  Patient is not on  anticoagulants and risk of bleeding discussed with patient.       I have personally reviewed all specimens and/or slides and used them with my medical judgement to determine or confirm the final diagnosis.     Patient told result actinic keratosis cryo .      It was a pleasure speaking to Narinder Edmonds today.  Previous clinic notes and pertinent laboratory tests were reviewed prior to Narinder Edmonds's visit.  BENIGN LESIONS DISCUSSED WITH PATIENT:  I discussed the specifics of tumor, prognosis, and genetics of benign lesions.  I explained that treatment of these lesions would be purely cosmetic and not medically neccessary.  I discussed with patient different removal options including excision, cautery and /or laser.      Nature and genetics of benign skin lesions dicussed with patient.  Signs and Symptoms of skin cancer discussed with patient.  Patient encouraged to perform monthly skin exams.  UV precautions reviewed with patient.  Risks of non-melanoma skin cancer discussed with patient   Return to clinic 12 months      Again, thank you for allowing me to participate in the care of your patient.        Sincerely,        Ike Edmonds MD

## 2022-03-30 NOTE — PATIENT INSTRUCTIONS
Wound Care Instructions     FOR SUPERFICIAL WOUNDS     [facility], [place] [phone]    [facility] [phone]                       AFTER 24 HOURS YOU SHOULD REMOVE THE BANDAGE AND BEGIN DAILY DRESSING CHANGES AS FOLLOWS:     1) Remove Dressing.     2) Clean and dry the area with tap water using a Q-tip or sterile gauze pad.     3) Apply Vaseline, Aquaphor, Polysporin ointment or Bacitracin ointment over entire wound.  Do NOT use Neosporin ointment.     4) Cover the wound with a band-aid, or a sterile non-stick gauze pad and micropore paper tape      REPEAT THESE INSTRUCTIONS AT LEAST ONCE A DAY UNTIL THE WOUND HAS COMPLETELY HEALED.    It is an old wives tale that a wound heals better when it is exposed to air and allowed to dry out. The wound will heal faster with a better cosmetic result if it is kept moist with ointment and covered with a bandage.    **Do not let the wound dry out.**      Supplies Needed:      *Cotton tipped applicators (Q-tips)    *Polysporin Ointment or Bacitracin Ointment (NOT NEOSPORIN)    *Band-aids or non-stick gauze pads and micropore paper tape.      PATIENT INFORMATION:    During the healing process you will notice a number of changes. All wounds develop a small halo of redness surrounding the wound.  This means healing is occurring. Severe itching with extensive redness usually indicates sensitivity to the ointment or bandage tape used to dress the wound.  You should call our office if this develops.      Swelling  and/or discoloration around your surgical site is common, particularly when performed around the eye.    All wounds normally drain.  The larger the wound the more drainage there will be.  After 7-10 days, you will notice the wound beginning to shrink and new skin will begin to grow.  The wound is healed when you can see skin has formed over the entire area.  A healed wound has a healthy, shiny look to the surface and is red to dark pink in color  to normalize.  Wounds may take approximately 4-6 weeks to heal.  Larger wounds may take 6-8 weeks.  After the wound is healed you may discontinue dressing changes.    You may experience a sensation of tightness as your wound heals. This is normal and will gradually subside.    Your healed wound may be sensitive to temperature changes. This sensitivity improves with time, but if you re having a lot of discomfort, try to avoid temperature extremes.    Patients frequently experience itching after their wound appears to have healed because of the continue healing under the skin.  Plain Vaseline will help relieve the itching.        POSSIBLE COMPLICATIONS    BLEEDIN. Leave the bandage in place.  2. Use tightly rolled up gauze or a cloth to apply direct pressure over the bandage for 30  minutes.  3. Reapply pressure for an additional 30 minutes if necessary  4. Use additional gauze and tape to maintain pressure once the bleeding has stopped.    WOUND CARE INSTRUCTIONS   FOR CRYOSURGERY   This area treated with liquid nitrogen should form a blister (areas treated may or may not blister-skin may just turn dark and slough off). You do not need to bandage the area unless a blister forms and breaks (which may be a few days). When the blister breaks, begin daily dressing changes as follows:  1) Clean and dry the area with tap water using clean Q-tip or sterile gauze pad.   2) Apply Polysporin ointment or Bacitracin ointment over entire wound. Do NOT use Neosporin ointment.   3) Cover the wound with a band-aid or sterile non-stick gauze pad and micropore paper tape.   REPEAT THESE INSTRUCTIONS AT LEAST ONCE A DAY UNTIL THE WOUND HAS COMPLETELY HEALED.   It is an old wives tale that a wound heals better when it is exposed to air and allowed to dry out. The wound will heal faster with a better cosmetic result if it is kept moist with ointment and covered with a bandage.   Do not let the wound dry out.   IMPORTANT  INFORMATION ON REVERSE SIDE   Supplies Needed:   *Cotton tipped applicators (Q-tips)   *Polysporin ointment or Bacitracin ointment (NOT NEOSPORIN)   *Band-aids, or non stick gauze pads and micropore paper tape   PATIENT INFORMATION   During the healing process you will notice a number of changes. All wounds develop a small halo of redness surrounding the wound. This means healing is occurring. Severe itching with extensive redness usually indicates sensitivity to the ointment or bandage tape used to dress the wound. You should call our office if this develops.   Swelling and/or discoloration around your surgical site is common, particularly when performed around the eye.   All wounds normally drain. The larger the wound the more drainage there will be. After 7-10 days, you will notice the wound beginning to shrink and new skin will begin to grow. The wound is healed when you can see skin has formed over the entire area. A healed wound has a healthy, shiny look to the surface and is red to dark pink in color to normalize. Wounds may take approximately 4-6 weeks to heal. Larger wounds may take 6-8 weeks. After the wound is healed you may discontinue dressing changes.   You may experience a sensation of tightness as your wound heals. This is normal and will gradually subside.   Your healed wound may be sensitive to temperature changes. This sensitivity improves with time, but if you re having a lot of discomfort, try to avoid temperature extremes.   Patients frequently experience itching after their wound appears to have healed because of the continue healing under the skin. Plain Vaseline will help relieve the itching.

## 2022-03-31 NOTE — TELEPHONE ENCOUNTER
Called patient and gave results. Patient was scheduled for Cyro with Dr. Edmonds.   Kavita Bone LPN

## 2022-04-06 ENCOUNTER — OFFICE VISIT (OUTPATIENT)
Dept: DERMATOLOGY | Facility: CLINIC | Age: 72
End: 2022-04-06
Payer: MEDICARE

## 2022-04-06 VITALS — HEART RATE: 66 BPM | DIASTOLIC BLOOD PRESSURE: 68 MMHG | SYSTOLIC BLOOD PRESSURE: 149 MMHG | OXYGEN SATURATION: 95 %

## 2022-04-06 DIAGNOSIS — L82.0 INFLAMED SEBORRHEIC KERATOSIS: ICD-10-CM

## 2022-04-06 DIAGNOSIS — Z85.828 HISTORY OF SKIN CANCER: ICD-10-CM

## 2022-04-06 DIAGNOSIS — D18.01 ANGIOMA OF SKIN: ICD-10-CM

## 2022-04-06 DIAGNOSIS — L21.9 DERMATITIS, SEBORRHEIC: ICD-10-CM

## 2022-04-06 DIAGNOSIS — L81.4 LENTIGO: ICD-10-CM

## 2022-04-06 DIAGNOSIS — L82.1 SEBORRHEIC KERATOSIS: ICD-10-CM

## 2022-04-06 DIAGNOSIS — D23.9 DERMAL NEVUS: ICD-10-CM

## 2022-04-06 DIAGNOSIS — L57.0 AK (ACTINIC KERATOSIS): Primary | ICD-10-CM

## 2022-04-06 PROCEDURE — 17110 DESTRUCTION B9 LES UP TO 14: CPT | Mod: 78 | Performed by: DERMATOLOGY

## 2022-04-06 PROCEDURE — 99207 PR NO CHARGE LOS: CPT | Mod: 24 | Performed by: DERMATOLOGY

## 2022-04-06 PROCEDURE — 17000 DESTRUCT PREMALG LESION: CPT | Mod: 79 | Performed by: DERMATOLOGY

## 2022-04-06 RX ORDER — FLUOCINONIDE TOPICAL SOLUTION USP, 0.05% 0.5 MG/ML
SOLUTION TOPICAL 2 TIMES DAILY
Qty: 120 ML | Refills: 3 | Status: SHIPPED | OUTPATIENT
Start: 2022-04-06

## 2022-04-06 NOTE — PROGRESS NOTES
Narinder Edmonds is an extremely pleasant 71 year old year old male patient here today for evaluation and managment of actinic keratosis on chest. He would inflamed seborrheic keratosis on right medial cheek re treated.    Patient has no other skin complaints today.  Remainder of the HPI, Meds, PMH, Allergies, FH, and SH was reviewed in chart.      Past Medical History:   Diagnosis Date     Squamous cell carcinoma        Past Surgical History:   Procedure Laterality Date     ESOPHAGOSCOPY, GASTROSCOPY, DUODENOSCOPY (EGD), COMBINED  4/9/2012    Procedure:COMBINED ESOPHAGOSCOPY, GASTROSCOPY, DUODENOSCOPY (EGD); Gastroscopy; Surgeon:DELMAR CAMPBELL; Location:[facility]        History reviewed. No pertinent family history.    Social History     Socioeconomic History     Marital status:      Spouse name: Not on file     Number of children: Not on file     Years of education: Not on file     Highest education level: Not on file   Occupational History     Not on file   Tobacco Use     Smoking status: Never Smoker     Smokeless tobacco: Never Used   Substance and Sexual Activity     Alcohol use: Not on file     Drug use: Not on file     Sexual activity: Not on file   Other Topics Concern     Not on file   Social History Narrative     Not on file     Social Determinants of Health     Financial Resource Strain: Not on file   Food Insecurity: Not on file   Transportation Needs: Not on file   Physical Activity: Not on file   Stress: Not on file   Social Connections: Not on file   Intimate Partner Violence: Not on file   Housing Stability: Not on file       Outpatient Encounter Medications as of 4/6/2022   Medication Sig Dispense Refill     ACETAMINOPHEN PO Take  by mouth.       apixaban ANTICOAGULANT (ELIQUIS) 5 MG tablet Take 5 mg by mouth       Ascorbic Acid (VITAMIN C PO) Take  by mouth.       atorvastatin (LIPITOR) 40 MG tablet Take 40 mg by mouth daily  0     Cholecalciferol (VITAMIN D3) 25 MCG (1000 UT) CAPS Take  1,000 Units by mouth       ciclopirox (LOPROX) 0.77 % cream Apply topically 2 times daily 90 g 3     clobetasol (TEMOVATE) 0.05 % external solution Apply sparingly to affected area twice daily on scalp. Do not apply to face. 100 mL 3     CLOBETASOL PROPIONATE 0.05 % EX SHAM        CLOBETASOL PROPIONATE POWD        Cyclobenzaprine HCl (FLEXERIL PO) Take  by mouth.       diclofenac (VOLTAREN XR) 100 MG 24 hr tablet Take 100 mg by mouth       diclofenac (VOLTAREN) 1 % topical gel Apply 1 Application topically       docusate sodium (COLACE) 100 MG capsule Take 100 mg by mouth       famotidine (PEPCID) 20 MG tablet famotidine 20 mg tablet       FELDENE 20 MG OR CAPS 1 CAPSULE ORALLY DAILY       fluocinonide (LIDEX) 0.05 % external solution Apply topically 2 times daily 120 mL 3     fluocinonide (LIDEX) 0.05 % solution        Levothyroxine Sodium (SYNTHROID PO) Take  by mouth.       losartan (COZAAR) 25 MG tablet Take 50 mg by mouth        metoprolol (LOPRESSOR) 50 MG tablet Take 50 mg by mouth        MULTIVITAMIN/IRON OR TABS None Entered       nitroGLYcerin (NITROSTAT) 0.4 MG sublingual tablet Place 0.4 mg under the tongue       Pyrithione Zinc (DERMAZINC SPRAY EX) Externally apply  topically.       ranitidine (ZANTAC) 150 MG tablet TAKE 1 TABLET BY MOUTH TWICE DAILY AS NEEDED  2     rivaroxaban ANTICOAGULANT (XARELTO) 20 MG TABS tablet Take 20 mg by mouth        sotalol (BETAPACE) 80 MG tablet TAKE 1 TABLET BY MOUTH TWICE DAILY BEFORE MEALS  2     tobramycin-dexamethasone (TOBRADEX) ophthalmic solution 1 drop every 4 hours (while awake).       aspirin 81 MG EC tablet Take 81 mg by mouth  (Patient not taking: Reported on 4/6/2022)       No facility-administered encounter medications on file as of 4/6/2022.             O:   NAD, WDWN, Alert & Oriented, Mood & Affect wnl, Vitals stable   Here today alone   General appearance normal   Vitals stable   Alert, oriented and in no acute distress     Chest red scaly plaque    R  medial cheek inflamed seborrheic keratosis       Eyes: Conjunctivae/lids:Normal     ENT: Lips, buccal mucosa, tongue: normal    MSK:Normal    Cardiovascular: peripheral edema none    Pulm: Breathing Normal    Neuro/Psych: Orientation:Alert and Orientedx3 ; Mood/Affect:normal       A/P:  1. Actinic keratosis chest   Pathophysiology discussed with pateint   LN2:  Treated with LN2 for 5s for 1-2 cycles. Warned risks of blistering, pain, pigment change, scarring, and incomplete resolution.  Advised patient to return if lesions do not completely resolve.  Wound care sheet given.  2. R medial cheek inflamed seborrheic keratosis   LN2:  Treated with LN2 for 5s for 1-2 cycles. Warned risks of blistering, pain, pigment change, scarring, and incomplete resolution.  Advised patient to return if lesions do not completely resolve.  Wound care sheet given.    It was a pleasure speaking to Narinder Edmonds today.  Previous clinic notes and pertinent laboratory tests were reviewed prior to Narinder Edmonds's visit.  Signs and Symptoms of skin cancer discussed with patient.  Patient encouraged to perform monthly skin exams.  UV precautions reviewed with patient.  Risks of non-melanoma skin cancer discussed with patient   Return to clinic 6 months

## 2022-04-06 NOTE — NURSING NOTE
Chief Complaint   Patient presents with     Derm Problem     follow up        Libra Villalta on 4/6/2022 at 11:30 AM

## 2022-04-06 NOTE — LETTER
4/6/2022         RE: Narinder Edmonds  [address] [address] [address]        Dear Colleague,    Thank you for referring your patient, Narinder Edmonds, to the [facility]. Please see a copy of my visit note below.    Narinder Edmonds is an extremely pleasant 71 year old year old male patient here today for evaluation and managment of actinic keratosis on chest. He would inflamed seborrheic keratosis on right medial cheek re treated.    Patient has no other skin complaints today.  Remainder of the HPI, Meds, PMH, Allergies, FH, and SH was reviewed in chart.      Past Medical History:   Diagnosis Date     Squamous cell carcinoma        Past Surgical History:   Procedure Laterality Date     ESOPHAGOSCOPY, GASTROSCOPY, DUODENOSCOPY (EGD), COMBINED  4/9/2012    Procedure:COMBINED ESOPHAGOSCOPY, GASTROSCOPY, DUODENOSCOPY (EGD); Gastroscopy; Surgeon:DELMAR CAMPBELL; Location:[facility]        History reviewed. No pertinent family history.    Social History     Socioeconomic History     Marital status:      Spouse name: Not on file     Number of children: Not on file     Years of education: Not on file     Highest education level: Not on file   Occupational History     Not on file   Tobacco Use     Smoking status: Never Smoker     Smokeless tobacco: Never Used   Substance and Sexual Activity     Alcohol use: Not on file     Drug use: Not on file     Sexual activity: Not on file   Other Topics Concern     Not on file   Social History Narrative     Not on file     Social Determinants of Health     Financial Resource Strain: Not on file   Food Insecurity: Not on file   Transportation Needs: Not on file   Physical Activity: Not on file   Stress: Not on file   Social Connections: Not on file   Intimate Partner Violence: Not on file   Housing Stability: Not on file       Outpatient Encounter Medications as of 4/6/2022   Medication Sig Dispense Refill     ACETAMINOPHEN PO Take  by mouth.        apixaban ANTICOAGULANT (ELIQUIS) 5 MG tablet Take 5 mg by mouth       Ascorbic Acid (VITAMIN C PO) Take  by mouth.       atorvastatin (LIPITOR) 40 MG tablet Take 40 mg by mouth daily  0     Cholecalciferol (VITAMIN D3) 25 MCG (1000 UT) CAPS Take 1,000 Units by mouth       ciclopirox (LOPROX) 0.77 % cream Apply topically 2 times daily 90 g 3     clobetasol (TEMOVATE) 0.05 % external solution Apply sparingly to affected area twice daily on scalp. Do not apply to face. 100 mL 3     CLOBETASOL PROPIONATE 0.05 % EX SHAM        CLOBETASOL PROPIONATE POWD        Cyclobenzaprine HCl (FLEXERIL PO) Take  by mouth.       diclofenac (VOLTAREN XR) 100 MG 24 hr tablet Take 100 mg by mouth       diclofenac (VOLTAREN) 1 % topical gel Apply 1 Application topically       docusate sodium (COLACE) 100 MG capsule Take 100 mg by mouth       famotidine (PEPCID) 20 MG tablet famotidine 20 mg tablet       FELDENE 20 MG OR CAPS 1 CAPSULE ORALLY DAILY       fluocinonide (LIDEX) 0.05 % external solution Apply topically 2 times daily 120 mL 3     fluocinonide (LIDEX) 0.05 % solution        Levothyroxine Sodium (SYNTHROID PO) Take  by mouth.       losartan (COZAAR) 25 MG tablet Take 50 mg by mouth        metoprolol (LOPRESSOR) 50 MG tablet Take 50 mg by mouth        MULTIVITAMIN/IRON OR TABS None Entered       nitroGLYcerin (NITROSTAT) 0.4 MG sublingual tablet Place 0.4 mg under the tongue       Pyrithione Zinc (DERMAZINC SPRAY EX) Externally apply  topically.       ranitidine (ZANTAC) 150 MG tablet TAKE 1 TABLET BY MOUTH TWICE DAILY AS NEEDED  2     rivaroxaban ANTICOAGULANT (XARELTO) 20 MG TABS tablet Take 20 mg by mouth        sotalol (BETAPACE) 80 MG tablet TAKE 1 TABLET BY MOUTH TWICE DAILY BEFORE MEALS  2     tobramycin-dexamethasone (TOBRADEX) ophthalmic solution 1 drop every 4 hours (while awake).       aspirin 81 MG EC tablet Take 81 mg by mouth  (Patient not taking: Reported on 4/6/2022)       No facility-administered encounter  medications on file as of 4/6/2022.             O:   NAD, WDWN, Alert & Oriented, Mood & Affect wnl, Vitals stable   Here today alone   General appearance normal   Vitals stable   Alert, oriented and in no acute distress     Chest red scaly plaque    R medial cheek inflamed seborrheic keratosis       Eyes: Conjunctivae/lids:Normal     ENT: Lips, buccal mucosa, tongue: normal    MSK:Normal    Cardiovascular: peripheral edema none    Pulm: Breathing Normal    Neuro/Psych: Orientation:Alert and Orientedx3 ; Mood/Affect:normal       A/P:  1. Actinic keratosis chest   Pathophysiology discussed with pateint   LN2:  Treated with LN2 for 5s for 1-2 cycles. Warned risks of blistering, pain, pigment change, scarring, and incomplete resolution.  Advised patient to return if lesions do not completely resolve.  Wound care sheet given.  2. R medial cheek inflamed seborrheic keratosis   LN2:  Treated with LN2 for 5s for 1-2 cycles. Warned risks of blistering, pain, pigment change, scarring, and incomplete resolution.  Advised patient to return if lesions do not completely resolve.  Wound care sheet given.    It was a pleasure speaking to Narinder Edmonds today.  Previous clinic notes and pertinent laboratory tests were reviewed prior to Narinder Edmonds's visit.  Signs and Symptoms of skin cancer discussed with patient.  Patient encouraged to perform monthly skin exams.  UV precautions reviewed with patient.  Risks of non-melanoma skin cancer discussed with patient   Return to clinic 6 months        Again, thank you for allowing me to participate in the care of your patient.        Sincerely,        Ike Edmonds MD

## 2022-05-09 ENCOUNTER — TELEPHONE (OUTPATIENT)
Dept: SLEEP MEDICINE | Facility: CLINIC | Age: 72
End: 2022-05-09
Payer: MEDICARE

## 2022-05-09 NOTE — TELEPHONE ENCOUNTER
LOV: 12/27/2019. Requesting CPAP supplies. Will discuss options moving forward as he does not want to commute this far to be seen.     Melissa Villalta RN on 5/9/2022 at 3:11 PM

## 2022-05-09 NOTE — TELEPHONE ENCOUNTER
Reason for Call:  Other call back    Detailed comments: PT says he needs prescriptions renewed from Dr. Velasquez, please call pt to advise if he has to be seen before renewals can take affect.     Phone Number Patient can be reached at: Home number on file [phone] (home) or Cell number on file:    Telephone Information:   Mobile [phone]       Best Time: ANY    Can we leave a detailed message on this number? YES    Call taken on 5/9/2022 at 2:27 PM by Nell Yanez

## 2024-01-13 ENCOUNTER — APPOINTMENT (OUTPATIENT)
Dept: CT IMAGING | Facility: CLINIC | Age: 74
End: 2024-01-13
Attending: EMERGENCY MEDICINE
Payer: MEDICARE

## 2024-01-13 ENCOUNTER — HOSPITAL ENCOUNTER (EMERGENCY)
Facility: CLINIC | Age: 74
Discharge: HOME | End: 2024-01-13
Attending: EMERGENCY MEDICINE | Admitting: EMERGENCY MEDICINE
Payer: MEDICARE

## 2024-01-13 VITALS
HEIGHT: 71 IN | TEMPERATURE: 97.9 F | BODY MASS INDEX: 28.23 KG/M2 | OXYGEN SATURATION: 98 % | DIASTOLIC BLOOD PRESSURE: 74 MMHG | WEIGHT: 201.61 LBS | SYSTOLIC BLOOD PRESSURE: 188 MMHG | RESPIRATION RATE: 18 BRPM | HEART RATE: 79 BPM

## 2024-01-13 DIAGNOSIS — S32.401A CLOSED NONDISPLACED FRACTURE OF RIGHT ACETABULUM, UNSPECIFIED PORTION OF ACETABULUM, INITIAL ENCOUNTER (H): ICD-10-CM

## 2024-01-13 DIAGNOSIS — Z79.01 ANTICOAGULATED: ICD-10-CM
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
## 2024-01-13 PROCEDURE — 99284 EMERGENCY DEPT VISIT MOD MDM: CPT | Performed by: EMERGENCY MEDICINE

## 2024-01-13 PROCEDURE — 250N000013 HC RX MED GY IP 250 OP 250 PS 637: Performed by: EMERGENCY MEDICINE

## 2024-01-13 PROCEDURE — 72192 CT PELVIS W/O DYE: CPT | Mod: MG

## 2024-01-13 PROCEDURE — 99284 EMERGENCY DEPT VISIT MOD MDM: CPT | Mod: 25 | Performed by: EMERGENCY MEDICINE

## 2024-01-13 RX ORDER — HYDROCODONE BITARTRATE AND ACETAMINOPHEN 5; 325 MG/1; MG/1
1 TABLET ORAL EVERY 6 HOURS PRN
Qty: 15 TABLET | Refills: 0 | Status: SHIPPED | OUTPATIENT
Start: 2024-01-13

## 2024-01-13 RX ORDER — HYDROCODONE BITARTRATE AND ACETAMINOPHEN 5; 325 MG/1; MG/1
2 TABLET ORAL ONCE
Status: COMPLETED | OUTPATIENT
Start: 2024-01-13 | End: 2024-01-13

## 2024-01-13 RX ADMIN — HYDROCODONE BITARTRATE AND ACETAMINOPHEN 2 TABLET: 5; 325 TABLET ORAL at 20:27

## 2024-01-13 ASSESSMENT — ACTIVITIES OF DAILY LIVING (ADL): ADLS_ACUITY_SCORE: 35

## 2024-01-14 NOTE — ED TRIAGE NOTES
Pt presents after falling off stool. No head strike, LOC. Pt is on eliquis. Pt reports right hip/buttock pain and left knee pain. Pt is using a cane for ambulation and refusing a wheelchair.      Triage Assessment (Adult)       Row Name 01/13/24 2007          Triage Assessment    Airway WDL WDL        Respiratory WDL    Respiratory WDL WDL        Skin Circulation/Temperature WDL    Skin Circulation/Temperature WDL WDL        Cardiac WDL    Cardiac WDL WDL        Peripheral/Neurovascular WDL    Peripheral Neurovascular WDL WDL        Cognitive/Neuro/Behavioral WDL    Cognitive/Neuro/Behavioral WDL WDL

## 2024-01-14 NOTE — DISCHARGE INSTRUCTIONS
Tylenol for pain.   Norco for severe pains.  Maximum acetaminophen daily dose = 4000mg.      Follow-up with orthopedics.  Referral placed

## 2024-01-14 NOTE — ED PROVIDER NOTES
ED Provider Note  [facility]      History     Chief Complaint   Patient presents with    Hip Pain     HPI  Narinder Edmonds is a 73 year old male who has medical history significant for atrial fibrillation anticoagulated on Eliquis, presenting emergency department with his wife.  Patient providing history information, stating that he was attempting to put ATV onto a steak and near the fireplace, and he was attempting to put his foot onto a stool, when he subsequently lost his balance, landing onto the right buttock/hip area, and the TV hit his left knee.  Patient did not hit his head.  There was no loss of consciousness.  Injury occurred at approximately 4 PM, 5 hours prior to ED arrival.  Patient has been ambulatory, with the use of a cane, which is not typical for the patient as he typically is walking without assistive devices.  No significant pain with range of motion of the left knee.  He has had increased amounts of pain in the right hip/buttock region.        Independent Historian:      Review of External Notes:          Allergies:  Allergies   Allergen Reactions    Codeine Sulfate Swelling    Morphine And Related Swelling     Edema    Percocet [Oxycodone-Acetaminophen] Rash       Problem List:    Patient Active Problem List    Diagnosis Date Noted    Coronary artery disease involving native coronary artery of native heart without angina pectoris 04/15/2019     Priority: Medium     LAD stent placed 12/26/18      Atrial fibrillation with rapid ventricular response (H) 09/01/2017     Priority: Medium    Psoriasis 01/26/2017     Priority: Medium    Sicca syndrome (H24) 04/02/2015     Priority: Medium    Sensorineural hearing loss 01/29/2015     Priority: Medium    Degeneration of intervertebral disc of lumbar region 04/12/2012     Priority: Medium    Horseshoe kidney 03/20/2012     Priority: Medium    GISELLE (obstructive sleep apnea) 03/25/2010     Priority: Medium     sleep study at  [facility] in October 2010(AHI 26, lowest oxygen saturation was 83%) for excessive daytime sleepiness. He was prescribed auto-CPAP 8-12 cm/H2O.   He underwent an all night PAP titration on 6/19/2011(205#, CPAP 15 cm/H20).      Hypertension 03/17/2009     Priority: Medium    Gastroesophageal reflux disease 12/07/2006     Priority: Medium    Acquired hypothyroidism 12/07/2006     Priority: Medium        Past Medical History:    Past Medical History:   Diagnosis Date    Squamous cell carcinoma        Past Surgical History:    Past Surgical History:   Procedure Laterality Date    ESOPHAGOSCOPY, GASTROSCOPY, DUODENOSCOPY (EGD), COMBINED  4/9/2012    Procedure:COMBINED ESOPHAGOSCOPY, GASTROSCOPY, DUODENOSCOPY (EGD); Gastroscopy; Surgeon:DELMAR CAMPBELL; Location:[facility]       Family History:    No family history on file.    Social History:  Marital Status:   [2]  Social History     Tobacco Use    Smoking status: Never    Smokeless tobacco: Never        Medications:    HYDROcodone-acetaminophen (NORCO) 5-325 MG tablet  ACETAMINOPHEN PO  apixaban ANTICOAGULANT (ELIQUIS) 5 MG tablet  Ascorbic Acid (VITAMIN C PO)  aspirin 81 MG EC tablet  atorvastatin (LIPITOR) 40 MG tablet  Cholecalciferol (VITAMIN D3) 25 MCG (1000 UT) CAPS  ciclopirox (LOPROX) 0.77 % cream  clobetasol (TEMOVATE) 0.05 % external solution  CLOBETASOL PROPIONATE 0.05 % EX SHAM  CLOBETASOL PROPIONATE POWD  Cyclobenzaprine HCl (FLEXERIL PO)  diclofenac (VOLTAREN XR) 100 MG 24 hr tablet  diclofenac (VOLTAREN) 1 % topical gel  docusate sodium (COLACE) 100 MG capsule  famotidine (PEPCID) 20 MG tablet  FELDENE 20 MG OR CAPS  fluocinonide (LIDEX) 0.05 % external solution  fluocinonide (LIDEX) 0.05 % solution  Levothyroxine Sodium (SYNTHROID PO)  losartan (COZAAR) 25 MG tablet  metoprolol (LOPRESSOR) 50 MG tablet  MULTIVITAMIN/IRON OR TABS  nitroGLYcerin (NITROSTAT) 0.4 MG sublingual tablet  Pyrithione Zinc (DERMAZINC SPRAY EX)  ranitidine (ZANTAC) 150 MG  "tablet  rivaroxaban ANTICOAGULANT (XARELTO) 20 MG TABS tablet  sotalol (BETAPACE) 80 MG tablet  tobramycin-dexamethasone (TOBRADEX) ophthalmic solution          Review of Systems  A medically appropriate review of systems was performed with pertinent positives and negatives noted in the HPI, and all other systems negative.    Physical Exam   Patient Vitals for the past 24 hrs:   BP Temp Temp src Pulse Resp SpO2 Height Weight   01/13/24 2005 (!) 188/74 97.9  F (36.6  C) Oral 79 18 98 % 1.803 m (5' 11\") 91.4 kg (201 lb 9.8 oz)          Physical Exam  General: alert and in  acute distress on arrival  Head: atraumatic, normocephalic  Lungs:  nonlabored  CV:  extremities warm and perfused  Right hip: Normal passive range of motion.  Patient was logrolled onto the left side, and does have tenderness over the mid buttock/sacral area  Abd: nondistended  Skin: no rashes, no diaphoresis and skin color normal  Neuro: Patient awake, alert, speech is fluent,   Psychiatric: affect/mood normal,        ED Course                 Procedures                           Results for orders placed or performed during the hospital encounter of 01/13/24 (from the past 24 hour(s))   CT Pelvis Bone wo Contrast    Narrative    EXAM: CT PELVIS BONE WO CONTRAST  LOCATION: [facility]  DATE: 1/13/2024    INDICATION: Fall with left pelvic and sacral pains.  COMPARISON: None.  TECHNIQUE: CT scan of the pelvis was performed without IV contrast. Multiplanar reformats were obtained. Dose reduction techniques were used.  CONTRAST: None.    FINDINGS:    Both hips are negative for fracture or CT evidence of avascular necrosis. Severe degenerative change is present at both hip joints. There is a fracture of the anterolateral margin of the right acetabulum but this primarily extends through osteophytic   proliferation along the anterolateral margin of the joint itself. This is seen on the axial source imaging series 2, image 80 " and partially visualized on series 4, image 55. This is nondisplaced.    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 Bony pelvis otherwise negative. Degenerative change lower lumbar spine.    The intrapelvic contents are negative for abnormal mass, free fluid, or lymphadenopathy. '      Impression    IMPRESSION:  1.  Both hips are negative for fracture or CT evidence of avascular necrosis.  2.  Severe degenerative change of both hip joints.  3.  There is an indeterminate chronicity fracture nondisplaced confined to the anterolateral margin of the acetabulum which is primarily contained within osteophytic spurring along the acetabular edge itself. No cortical step-off is identified at the   joint margin.  4.  Partial ankylosis of both SI joints.  5.  Bony pelvis otherwise negative.  6.  Intrapelvic contents negative.       MEDICATIONS GIVEN IN THE EMERGENCY DEPARTMENT:  Medications   HYDROcodone-acetaminophen (NORCO) 5-325 MG per tablet 2 tablet (2 tablets Oral $Given 1/13/24 2027)           Independent Interpretation (X-rays, CTs, rhythm strip):  CT images personally reviewed and show right nondisplaced age indeterminant acetabular fracture.  No other acute findings.      Consultations/Discussion of Management or Tests:  None       Social Determinants of Health affecting care:         Assessments & Plan (with Medical Decision Making)  73 year old male who presents to the Emergency Department for evaluation of right buttock and hip pain in the context of a fall from standing height.  Patient well-appearing, nontoxic, and does have primarily right hip/buttock pain.  Also does have slight abrasion of the left knee, however has been ambulatory, and there is no tenderness to palpation of the left knee, and therefore x-ray images are considered, however based on the ambulatory status, no indication for x-ray imaging.    Patient has pain deeper in within the sacrum/pelvis, and therefore decision made to go right to CT.   CT scan showing age-indeterminate right-sided acetabular fracture.  Patient given Norco for pain.  Has intolerance to oxycodone.  I discussed findings with the patient.  He continues with occasional worsening waves of pain, however given the nondisplaced nature, I do not feel that this is a surgical issue, and patient will be referred to outpatient orthopedic provider with weightbearing as tolerated.  Patient has been walking over the past 5 hours, however with some increased amounts of pain, and therefore Norco will be prescribed for home.  Patient encouraged to follow-up with orthopedic provider, and return if severe worsening pains.        I have reviewed the nursing notes.    I have reviewed the findings, diagnosis, plan and need for follow up with the patient.       Critical Care time:  none      NEW PRESCRIPTIONS STARTED AT TODAY'S ER VISIT  New Prescriptions    HYDROCODONE-ACETAMINOPHEN (NORCO) 5-325 MG TABLET    Take 1 tablet by mouth every 6 hours as needed for severe pain       Final diagnoses:   Fall, initial encounter   Closed nondisplaced fracture of right acetabulum, unspecified portion of acetabulum, initial encounter (H) - age indeterminate   Anticoagulated       1/13/2024   [facility] EMERGENCY DEPT       Jesus Bright MD  01/13/24 [id number]     DISPLAY PLAN FREE TEXT

## 2025-03-31 ENCOUNTER — MEDICAL CORRESPONDENCE (OUTPATIENT)
Dept: HEALTH INFORMATION MANAGEMENT | Facility: CLINIC | Age: 75
End: 2025-03-31
Payer: MEDICARE

## 2025-04-07 ENCOUNTER — TELEPHONE (OUTPATIENT)
Dept: DERMATOLOGY | Facility: CLINIC | Age: 75
End: 2025-04-07
Payer: MEDICARE

## 2025-04-07 NOTE — TELEPHONE ENCOUNTER
[facility] Call Center    Phone Message    May a detailed message be left on voicemail: yes     Reason for Call: Other: pt reporting that they should be having a referral sent from [facility], pt has asked them to send twice now but not showing in system, please follow up with pt to advise if this is received or about getting scheduled for MOHS, Shaun has previously see Dr. Edmonds for MOHS in the past.     Shaun had his initial appt with Dr. Villegas on March 20th    Action Taken: Other: TE to [facility]    Travel Screening: Not Applicable     Date of Service:

## 2025-04-07 NOTE — LETTER
[facility]  [address] [place] [address]  [phone]      April 9, 2025    Narinder Edmonds  [address] [address] [address]      Dear Narinder      You are scheduled for Mohs Surgery on Monday, April 28th, 2025  at 7:45 am.     Please check in at 2nd floor Dermatology Clinic.     Be sure to eat a good breakfast and bathe and wash your hair prior to Surgery. Please bring  with you if this is above your neck    If you are taking any anti-coagulants that are prescribed by your Doctor (such as Coumadin/warfarin, Plavix, Aspirin, Ibuprofen), please continue taking them.     However, If you are taking anti-coagulants over the counter without  a Doctor's order for a Medical condition, please discontinue them 10 days prior to Surgery.      Please wear loose comfortable clothing as it could possibly be 4-6 hours until your surgery is completed depending upon how many layers of tissue need to be removed.     If you need any mobility assistance (getting on the exam chair or toilet) please bring a caregiver, family member, or staff member to assist you. We are not equipped to transfer patients.    Thank you,    Ike Edmonds MD

## 2025-04-08 ENCOUNTER — TRANSCRIBE ORDERS (OUTPATIENT)
Dept: OTHER | Age: 75
End: 2025-04-08

## 2025-04-08 DIAGNOSIS — C44.90 SKIN CANCER: Primary | ICD-10-CM

## 2025-04-09 NOTE — TELEPHONE ENCOUNTER
[facility]CC Left infraocular cheek     Called patient:  Patient notified and educated on test results   Mohs procedure explained- all questions answered  appointment scheduled- mohs packet mailed.    Thank you,    Alecia OCHOA,RN BSN  [facility]  [phone]     View All Conversations on this Encounter  Tamie Lucas; Jase- Referral4 hours ago (10:54 AM)     INDY PATEL has received and entered referral - Benefits check complete - ok to schedule.    Thank you,  Tamie - JASE

## 2025-04-28 ENCOUNTER — OFFICE VISIT (OUTPATIENT)
Dept: DERMATOLOGY | Facility: CLINIC | Age: 75
End: 2025-04-28
Payer: MEDICARE

## 2025-04-28 DIAGNOSIS — Z85.828 HISTORY OF SKIN CANCER: ICD-10-CM

## 2025-04-28 DIAGNOSIS — C44.319 BASAL CELL CARCINOMA (BCC) OF LEFT CHEEK: ICD-10-CM

## 2025-04-28 DIAGNOSIS — D18.01 ANGIOMA OF SKIN: ICD-10-CM

## 2025-04-28 DIAGNOSIS — L81.4 LENTIGO: ICD-10-CM

## 2025-04-28 DIAGNOSIS — D23.9 DERMAL NEVUS: Primary | ICD-10-CM

## 2025-04-28 DIAGNOSIS — L82.1 SEBORRHEIC KERATOSES: ICD-10-CM

## 2025-04-28 PROCEDURE — 17311 MOHS 1 STAGE H/N/HF/G: CPT | Performed by: DERMATOLOGY

## 2025-04-28 PROCEDURE — 13132 CMPLX RPR F/C/C/M/N/AX/G/H/F: CPT | Performed by: DERMATOLOGY

## 2025-04-28 PROCEDURE — 99203 OFFICE O/P NEW LOW 30 MIN: CPT | Mod: 25 | Performed by: DERMATOLOGY

## 2025-04-28 NOTE — PATIENT INSTRUCTIONS
Sutured Wound Care     [facility] [phone]    Left Cheek    No strenuous activity for 48 hours. Resume moderate activity in 48 hours. No heavy exercising until you are seen for follow up in one week.     Take Tylenol as needed for discomfort.                         Do not drink alcoholic beverages for 48 hours.     Keep the pressure bandage in place for 24 hours. If the bandage becomes blood tinged or loose, reinforce it with gauze and tape.        (Refer to the reverse side of this page for management of bleeding).    Remove pressure bandage in 24 hours (White Gauze & White Tape)     Leave the flat bandage in place until your follow up appointment. (Brown Flesh Color Tape & Steri Stripes)     Keep the bandage dry. Wash around it carefully.    If the tape becomes soiled or starts to come off, reinforce it with additional paper tape.    Do not smoke for 3 weeks; smoking is detrimental to wound healing.    It is normal to have swelling and bruising around the surgical site. The bruising will fade in approximately 10-14 days. Elevate the area to reduce swelling.    Numbness, itchiness and sensitivity to temperature changes can occur after surgery and may take up to 18 months to normalize.      POSSIBLE COMPLICATIONS    BLEEDING:    Leave the bandage in place.  Use tightly rolled up gauze or a cloth to apply direct pressure over the bandage for 20   minutes.  Reapply pressure for an additional 20 minutes if necessary  Call the office or go to the nearest emergency room if pressure fails to stop the bleeding.  Use additional gauze and tape to maintain pressure once the bleeding has stopped.    PAIN:    Post operative pain should slowly get better, never worse.  A severe increase in pain may indicate a problem. Call the office if this occurs.    In case of emergency phone:Dr Edmonds [phone]       Proper skin care from [facility]:    -Eliminate harsh  soaps as they strip the natural oils from the skin, often resulting in dry itchy skin ( i.e. Dial, Zest, Wallisian Spring)  -Use mild soaps such as Cetaphil or Dove Sensitive Skin in the shower. You do not need to use soap on arms, legs, and trunk every time you shower unless visibly soiled.   -Avoid hot or cold showers.  -After showering, lightly dry off and apply moisturizing within 2-3 minutes. This will help trap moisture in the skin.   -Aggressive use of a moisturizer at least 1-2 times a day to the entire body (including -Vanicream, Cetaphil, Aquaphor or Cerave) and moisturize hands after every washing.  -We recommend using moisturizers that come in a tub that needs to be scooped out, not a pump. This has more of an oil base. It will hold moisture in your skin much better than a water base moisturizer. The above recommended are non-pore clogging.      Wear a sunscreen with at least SPF 30 on your face, ears, neck and V of the chest daily. Wear sunscreen on other areas of the body if those areas are exposed to the sun throughout the day. Sunscreens can contain physical and/or chemical blockers. Physical blockers are less likely to clog pores, these include zinc oxide and titanium dioxide. Reapply every two hour and after swimming.     Sunscreen examples: https://www.ewg.org/sunscreen/    UV radiation  UVA radiation remains constant throughout the day and throughout the year. It is a longer wavelength than UVB and therefore penetrates deeper into the skin leading to immediate and delayed tanning, photoaging, and skin cancer. 70-80% of UVA and UVB radiation occurs between the hours of 10am-2pm.  UVB radiation  UVB radiation causes the most harmful effects and is more significant during the summer months. However, snow and ice can reflect UVB radiation leading to skin damage during the winter months as well. UVB radiation is responsible for tanning, burning, inflammation, delayed erythema (pinkness), pigmentation  (brown spots), and skin cancer.     I recommend self monthly full body exams and yearly full body exams with a dermatology provider. If you develop a new or changing lesion please follow up for examination. Most skin cancers are pink and scaly or pink and pearly. However, we do see blue/brown/black skin cancers.  Consider the ABCDEs of melanoma when giving yourself your monthly full body exam ( don't forget the groin, buttocks, feet, toes, etc). A-asymmetry, B-borders, C-color, D-diameter, E-elevation or evolving. If you see any of these changes please follow up in clinic. If you cannot see your back I recommend purchasing a hand held mirror to use with a larger wall mirror.       Checking for Skin Cancer  You can find cancer early by checking your skin each month. There are 3 kinds of skin cancer. They are melanoma, basal cell carcinoma, and squamous cell carcinoma. Doing monthly skin checks is the best way to find new marks or skin changes. Follow the instructions below for checking your skin.   The ABCDEs of checking moles for melanoma   Check your moles or growths for signs of melanoma using ABCDE:   Asymmetry: the sides of the mole or growth don t match  Border: the edges are ragged, notched, or blurred  Color: the color within the mole or growth varies  Diameter: the mole or growth is larger than 6 mm (size of a pencil eraser)  Evolving: the size, shape, or color of the mole or growth is changing (evolving is not shown in the images below)    Checking for other types of skin cancer  Basal cell carcinoma or squamous cell carcinoma have symptoms such as:     A spot or mole that looks different from all other marks on your skin  Changes in how an area feels, such as itching, tenderness, or pain  Changes in the skin's surface, such as oozing, bleeding, or scaliness  A sore that does not heal  New swelling or redness beyond the border of a mole    Who s at risk?  Anyone can get skin cancer. But you are at greater  risk if you have:   Fair skin, light-colored hair, or light-colored eyes  Many moles or abnormal moles on your skin  A history of sunburns from sunlight or tanning beds  A family history of skin cancer  A history of exposure to radiation or chemicals  A weakened immune system  If you have had skin cancer in the past, you are at risk for recurring skin cancer.   How to check your skin  Do your monthly skin checkups in front of a full-length mirror. Check all parts of your body, including your:   Head (ears, face, neck, and scalp)  Torso (front, back, and sides)  Arms (tops, undersides, upper, and lower armpits)  Hands (palms, backs, and fingers, including under the nails)  Buttocks and genitals  Legs (front, back, and sides)  Feet (tops, soles, toes, including under the nails, and between toes)  If you have a lot of moles, take digital photos of them each month. Make sure to take photos both up close and from a distance. These can help you see if any moles change over time.   Most skin changes are not cancer. But if you see any changes in your skin, call your doctor right away. Only he or she can diagnose a problem. If you have skin cancer, seeing your doctor can be the first step toward getting the treatment that could save your life.   [employer] last reviewed this educational content on 4/1/2019 2000-2020 [employer]. [address] [place] [address]. All rights reserved. This information is not intended as a substitute for professional medical care. Always follow your healthcare professional's instructions.       When should I call my doctor?  If you are worsening or not improving, please, contact us or seek urgent care as noted below.     Who should I call with questions (adults)?    [facility] [place] [phone]  For urgent needs outside of business hours call the [facility] at [phone] and ask for the dermatology resident on call to be paged  If this  is a medical emergency and you are unable to reach an ER, Call 911      If you need a prescription refill, please contact your pharmacy. Refills are approved or denied by our Physicians during normal business hours, Monday through Friday.  Per office policy, refills will not be granted if you have not been seen within the past year (or sooner depending on the condition).

## 2025-04-28 NOTE — PROGRESS NOTES
Narinder Edmonds , a 74 year old year old male patient, I was asked to see by Dr. Lazaro for basal cell carcinoma on left infraorbital cheek.  Patient has no other skin complaints today.  Remainder of the HPI, Meds, PMH, Allergies, FH, and SH was reviewed in chart.      Past Medical History:   Diagnosis Date    Squamous cell carcinoma        Past Surgical History:   Procedure Laterality Date    ESOPHAGOSCOPY, GASTROSCOPY, DUODENOSCOPY (EGD), COMBINED  4/9/2012    Procedure:COMBINED ESOPHAGOSCOPY, GASTROSCOPY, DUODENOSCOPY (EGD); Gastroscopy; Surgeon:DELMAR CAMPBELL; Location:[facility]        No family history on file.    Social History     Socioeconomic History    Marital status:      Spouse name: Not on file    Number of children: Not on file    Years of education: Not on file    Highest education level: Not on file   Occupational History    Not on file   Tobacco Use    Smoking status: Never    Smokeless tobacco: Never   Substance and Sexual Activity    Alcohol use: Not on file    Drug use: Not on file    Sexual activity: Not on file   Other Topics Concern    Not on file   Social History Narrative    Not on file     Social Drivers of Health     Financial Resource Strain: Low Risk  (4/30/2024)    Received from [employer] [facility]    Financial Resource Strain     Difficulty of Paying Living Expenses: 3     Difficulty of Paying Living Expenses: Not on file   Food Insecurity: No Food Insecurity (8/22/2024)    Received from [employer] and [facility]    Hunger Vital Sign     Worried About Running Out of Food in the Last Year: Never true     Ran Out of Food in the Last Year: Never true   Transportation Needs: No Transportation Needs (8/22/2024)    Received from [employer] and [facility]    Transportation Needs     In the past 12 months, has lack of transportation kept you from medical appointments, meetings, work, or from getting medicines or things needed for daily living?:  No   Physical Activity: Not on file   Stress: Not on file   Social Connections: Socially Integrated (4/30/2024)    [facility]    Social Connections     Do you often feel lonely or isolated from those around you?: 0   Interpersonal Safety: Not At Risk (8/22/2024)    Received from [facility]    Intimate Partner Violence     Are you in a relationship where you are physically hurt, threatened and/or made to feel afraid?: No   Housing Stability: Low Risk  (8/22/2024)    Received from [facility]    Housing Stability Vital Sign     Unable to Pay for Housing in the Last Year: No     Number of Times Moved in the Last Year: 0     Homeless in the Last Year: No       Outpatient Encounter Medications as of 4/28/2025   Medication Sig Dispense Refill    ACETAMINOPHEN PO Take  by mouth.      apixaban ANTICOAGULANT (ELIQUIS) 5 MG tablet Take 5 mg by mouth      Ascorbic Acid (VITAMIN C PO) Take  by mouth.      aspirin 81 MG EC tablet Take 81 mg by mouth.      atorvastatin (LIPITOR) 40 MG tablet Take 40 mg by mouth daily  0    Cholecalciferol (VITAMIN D3) 25 MCG (1000 UT) CAPS Take 1,000 Units by mouth      ciclopirox (LOPROX) 0.77 % cream Apply topically 2 times daily 90 g 3    clobetasol (TEMOVATE) 0.05 % external solution Apply sparingly to affected area twice daily on scalp. Do not apply to face. 100 mL 3    CLOBETASOL PROPIONATE 0.05 % EX SHAM       CLOBETASOL PROPIONATE POWD       Cyclobenzaprine HCl (FLEXERIL PO) Take  by mouth.      diclofenac (VOLTAREN XR) 100 MG 24 hr tablet Take 100 mg by mouth      diclofenac (VOLTAREN) 1 % topical gel Apply 1 Application topically      docusate sodium (COLACE) 100 MG capsule Take 100 mg by mouth      famotidine (PEPCID) 20 MG tablet famotidine 20 mg tablet      FELDENE 20 MG OR CAPS 1 CAPSULE ORALLY DAILY      fluocinonide (LIDEX) 0.05 % external solution Apply topically 2 times daily 120 mL 3     fluocinonide (LIDEX) 0.05 % solution       HYDROcodone-acetaminophen (NORCO) 5-325 MG tablet Take 1 tablet by mouth every 6 hours as needed for severe pain 15 tablet 0    Levothyroxine Sodium (SYNTHROID PO) Take  by mouth.      losartan (COZAAR) 25 MG tablet Take 50 mg by mouth       metoprolol (LOPRESSOR) 50 MG tablet Take 50 mg by mouth       MULTIVITAMIN/IRON OR TABS None Entered      nitroGLYcerin (NITROSTAT) 0.4 MG sublingual tablet Place 0.4 mg under the tongue      Pyrithione Zinc (DERMAZINC SPRAY EX) Externally apply  topically.      ranitidine (ZANTAC) 150 MG tablet TAKE 1 TABLET BY MOUTH TWICE DAILY AS NEEDED  2    rivaroxaban ANTICOAGULANT (XARELTO) 20 MG TABS tablet Take 20 mg by mouth       sotalol (BETAPACE) 80 MG tablet TAKE 1 TABLET BY MOUTH TWICE DAILY BEFORE MEALS  2    tobramycin-dexamethasone (TOBRADEX) ophthalmic solution 1 drop every 4 hours (while awake).       No facility-administered encounter medications on file as of 4/28/2025.             Review Of Systems  Skin: As above  Eyes: negative  Ears/Nose/Throat: negative  Respiratory: No shortness of breath, dyspnea on exertion, cough, or hemoptysis  Cardiovascular: negative  Gastrointestinal: negative  Genitourinary: negative  Musculoskeletal: negative  Neurologic: negative  Psychiatric: negative  Hematologic/Lymphatic/Immunologic: negative  Endocrine: negative      O:   NAD, WDWN, Alert & Oriented, Mood & Affect wnl, Vitals stable   General appearance modesto ii   Vitals stable   Alert, oriented and in no acute distress   L infraobirtal cheek 4mm scaly papule   Stuck on papules and brown macules on trunk and ext    Red papules on scalp    Flesh colored papules on face      Eyes: Conjunctivae/lids:Normal     ENT: Lips, mucosa: normal    MSK:Normal    Cardiovascular: peripheral edema none    Pulm: Breathing Normal    Neuro/Psych: Orientation:Normal; Mood/Affect:Normal      A/P:  1. Seborrheic keratosis, lentigo, angioma, dermal nevus, hx of  non-melanoma skin cancer   2. L cheek basal cell carcinoma   It was a pleasure speaking to Narinder Edmonds today.  Previous clinic  notes and pertinent laboratory tests were reviewed prior to Narinder Edmonds's visit.  Signs and Symptoms of skin cancer discussed with patient.  Patient encouraged to perform monthly skin exams.  UV precautions reviewed with patient.  Risks of non-melanoma skin cancer discussed with patient   Return to clinic 6 months  PROCEDURE NOTE  L cheek basal cell carcinoma   MOHS:   Location    The rationale for Mohs surgery was discussed with the patient and consent was obtained.  The risks and benefits as well as alternatives to therapy were discussed, in detail.  Specifically, the risks of infection, scarring, bleeding, prolonged wound healing, incomplete removal, allergy to anesthesia, nerve injury and recurrence were addressed.  Indication for Mohs was Location. Prior to the procedure, the treatment site was clearly identified and, if available, confirmed with previous photos and confirmed by the patient   All components of the Universal Protocol/PAUSE rule were completed.  The Mohs surgeon operated in two distinct and integrated capacities as the surgeon and pathologist.      The area was prepped with Betasept.  A rim of normal appearing skin was marked circumferentially around the lesion.  The area was infiltrated with local anesthesia.  The tumor was first debulked to remove all clinically apparent tumor.  An incision following the standard Mohs approach was done and the specimen was oriented,mapped and placed in 1 block(s).  Each specimen was then chromacoded and processed in the Mohs laboratory using standard Mohs technique and submitted for frozen section histology.  Frozen section analysis showed no residual tumor but CLEAR MARGINS.      The tumor was excised using standard Mohs technique in 1 stages(s).  CLEAR MARGINS OBTAINED and Final defect size was 0.9 cm.     We discussed the  options for wound management in full with the patient including risks/benefits/ possible outcomes.      REPAIR COMPLEX: Because of the tightness of the surrounding skin and Because of the size and full thickness nature of the defect, Because of the tightness of the surrounding skin, To maintain form and function, In order to avoid distortion, and Because of the proximity to the lid, a complex closure was planned. After LE anesthesia and prep, Burow's triangles were excised in the relaxed skin tension lines. The wound edges were widely undermined greater than width of the defect on both sides (by dissection in the subcutaneous plane until adequate tissue mobility was obtained. Hemostasis was obtained. The wound edges were closed in a layered fashion using Vicryl and Fast Absorbing Plain Gut sutures. Postoperative length was 3.3 cm.   EBL minimal; complications none; wound care routine.  The patient was discharged in good condition and will return in one week for wound evaluation.

## 2025-04-28 NOTE — LETTER
4/28/2025      Narinder Edmonds  [address] [facility] [address]      Dear Colleague,    Thank you for referring your patient, Narinder Edmonds, to the [facility]. Please see a copy of my visit note below.    Surgical Office Location :   [facility]  [address] [place] [address]      Narinder Edmonds , a 74 year old year old male patient, I was asked to see by Dr. Lazaro for basal cell carcinoma on left infraorbital cheek.  Patient has no other skin complaints today.  Remainder of the HPI, Meds, PMH, Allergies, FH, and SH was reviewed in chart.      Past Medical History:   Diagnosis Date     Squamous cell carcinoma        Past Surgical History:   Procedure Laterality Date     ESOPHAGOSCOPY, GASTROSCOPY, DUODENOSCOPY (EGD), COMBINED  4/9/2012    Procedure:COMBINED ESOPHAGOSCOPY, GASTROSCOPY, DUODENOSCOPY (EGD); Gastroscopy; Surgeon:DELMAR CAMPBELL; Location:[facility]        No family history on file.    Social History     Socioeconomic History     Marital status:      Spouse name: Not on file     Number of children: Not on file     Years of education: Not on file     Highest education level: Not on file   Occupational History     Not on file   Tobacco Use     Smoking status: Never     Smokeless tobacco: Never   Substance and Sexual Activity     Alcohol use: Not on file     Drug use: Not on file     Sexual activity: Not on file   Other Topics Concern     Not on file   Social History Narrative     Not on file     Social Drivers of Health     Financial Resource Strain: Low Risk  (4/30/2024)    [facility]    Financial Resource Strain      Difficulty of Paying Living Expenses: 3      Difficulty of Paying Living Expenses: Not on file   Food Insecurity: No Food Insecurity (8/22/2024)    Received from [facility]    Hunger Vital Sign      Worried About Running Out of Food in the Last Year:  Never true      Ran Out of Food in the Last Year: Never true   Transportation Needs: No Transportation Needs (8/22/2024)    Received [facility]    Transportation Needs      In the past 12 months, has lack of transportation kept you from medical appointments, meetings, work, or from getting medicines or things needed for daily living?: No   Physical Activity: Not on file   Stress: Not on file   Social Connections: Socially Integrated (4/30/2024)    Received from [facility]    Social Connections      Do you often feel lonely or isolated from those around you?: 0   Interpersonal Safety: Not At Risk (8/22/2024)    [facility]    Intimate Partner Violence      Are you in a relationship where you are physically hurt, threatened and/or made to feel afraid?: No   Housing Stability: Low Risk  (8/22/2024)    [facility]    Housing Stability Vital Sign      Unable to Pay for Housing in the Last Year: No      Number of Times Moved in the Last Year: 0      Homeless in the Last Year: No       Outpatient Encounter Medications as of 4/28/2025   Medication Sig Dispense Refill     ACETAMINOPHEN PO Take  by mouth.       apixaban ANTICOAGULANT (ELIQUIS) 5 MG tablet Take 5 mg by mouth       Ascorbic Acid (VITAMIN C PO) Take  by mouth.       aspirin 81 MG EC tablet Take 81 mg by mouth.       atorvastatin (LIPITOR) 40 MG tablet Take 40 mg by mouth daily  0     Cholecalciferol (VITAMIN D3) 25 MCG (1000 UT) CAPS Take 1,000 Units by mouth       ciclopirox (LOPROX) 0.77 % cream Apply topically 2 times daily 90 g 3     clobetasol (TEMOVATE) 0.05 % external solution Apply sparingly to affected area twice daily on scalp. Do not apply to face. 100 mL 3     CLOBETASOL PROPIONATE 0.05 % EX SHAM        CLOBETASOL PROPIONATE POWD        Cyclobenzaprine HCl (FLEXERIL PO) Take  by mouth.       diclofenac (VOLTAREN XR) 100  MG 24 hr tablet Take 100 mg by mouth       diclofenac (VOLTAREN) 1 % topical gel Apply 1 Application topically       docusate sodium (COLACE) 100 MG capsule Take 100 mg by mouth       famotidine (PEPCID) 20 MG tablet famotidine 20 mg tablet       FELDENE 20 MG OR CAPS 1 CAPSULE ORALLY DAILY       fluocinonide (LIDEX) 0.05 % external solution Apply topically 2 times daily 120 mL 3     fluocinonide (LIDEX) 0.05 % solution        HYDROcodone-acetaminophen (NORCO) 5-325 MG tablet Take 1 tablet by mouth every 6 hours as needed for severe pain 15 tablet 0     Levothyroxine Sodium (SYNTHROID PO) Take  by mouth.       losartan (COZAAR) 25 MG tablet Take 50 mg by mouth        metoprolol (LOPRESSOR) 50 MG tablet Take 50 mg by mouth        MULTIVITAMIN/IRON OR TABS None Entered       nitroGLYcerin (NITROSTAT) 0.4 MG sublingual tablet Place 0.4 mg under the tongue       Pyrithione Zinc (DERMAZINC SPRAY EX) Externally apply  topically.       ranitidine (ZANTAC) 150 MG tablet TAKE 1 TABLET BY MOUTH TWICE DAILY AS NEEDED  2     rivaroxaban ANTICOAGULANT (XARELTO) 20 MG TABS tablet Take 20 mg by mouth        sotalol (BETAPACE) 80 MG tablet TAKE 1 TABLET BY MOUTH TWICE DAILY BEFORE MEALS  2     tobramycin-dexamethasone (TOBRADEX) ophthalmic solution 1 drop every 4 hours (while awake).       No facility-administered encounter medications on file as of 4/28/2025.             Review Of Systems  Skin: As above  Eyes: negative  Ears/Nose/Throat: negative  Respiratory: No shortness of breath, dyspnea on exertion, cough, or hemoptysis  Cardiovascular: negative  Gastrointestinal: negative  Genitourinary: negative  Musculoskeletal: negative  Neurologic: negative  Psychiatric: negative  Hematologic/Lymphatic/Immunologic: negative  Endocrine: negative      O:   NAD, WDWN, Alert & Oriented, Mood & Affect wnl, Vitals stable   General appearance modesto ii   Vitals stable   Alert, oriented and in no acute distress   L infraobirtal cheek 4mm scaly  papule   Stuck on papules and brown macules on trunk and ext    Red papules on scalp    Flesh colored papules on face      Eyes: Conjunctivae/lids:Normal     ENT: Lips, mucosa: normal    MSK:Normal    Cardiovascular: peripheral edema none    Pulm: Breathing Normal    Neuro/Psych: Orientation:Normal; Mood/Affect:Normal      A/P:  1. Seborrheic keratosis, lentigo, angioma, dermal nevus, hx of non-melanoma skin cancer   2. L cheek basal cell carcinoma   It was a pleasure speaking to Narinder Edmonds today.  Previous clinic  notes and pertinent laboratory tests were reviewed prior to Narinder Edmonds's visit.  Signs and Symptoms of skin cancer discussed with patient.  Patient encouraged to perform monthly skin exams.  UV precautions reviewed with patient.  Risks of non-melanoma skin cancer discussed with patient   Return to clinic 6 months  PROCEDURE NOTE  L cheek basal cell carcinoma   MOHS:   Location    The rationale for Mohs surgery was discussed with the patient and consent was obtained.  The risks and benefits as well as alternatives to therapy were discussed, in detail.  Specifically, the risks of infection, scarring, bleeding, prolonged wound healing, incomplete removal, allergy to anesthesia, nerve injury and recurrence were addressed.  Indication for Mohs was Location. Prior to the procedure, the treatment site was clearly identified and, if available, confirmed with previous photos and confirmed by the patient   All components of the Universal Protocol/PAUSE rule were completed.  The Mohs surgeon operated in two distinct and integrated capacities as the surgeon and pathologist.      The area was prepped with Betasept.  A rim of normal appearing skin was marked circumferentially around the lesion.  The area was infiltrated with local anesthesia.  The tumor was first debulked to remove all clinically apparent tumor.  An incision following the standard Mohs approach was done and the specimen was oriented,mapped  and placed in 1 block(s).  Each specimen was then chromacoded and processed in the Mohs laboratory using standard Mohs technique and submitted for frozen section histology.  Frozen section analysis showed no residual tumor but CLEAR MARGINS.      The tumor was excised using standard Mohs technique in 1 stages(s).  CLEAR MARGINS OBTAINED and Final defect size was 0.9 cm.     We discussed the options for wound management in full with the patient including risks/benefits/ possible outcomes.      REPAIR COMPLEX: Because of the tightness of the surrounding skin and Because of the size and full thickness nature of the defect, Because of the tightness of the surrounding skin, To maintain form and function, In order to avoid distortion, and Because of the proximity to the lid, a complex closure was planned. After LE anesthesia and prep, Burow's triangles were excised in the relaxed skin tension lines. The wound edges were widely undermined greater than width of the defect on both sides (by dissection in the subcutaneous plane until adequate tissue mobility was obtained. Hemostasis was obtained. The wound edges were closed in a layered fashion using Vicryl and Fast Absorbing Plain Gut sutures. Postoperative length was 3.3 cm.   EBL minimal; complications none; wound care routine.  The patient was discharged in good condition and will return in one week for wound evaluation.        Again, thank you for allowing me to participate in the care of your patient.        Sincerely,        Ike Edmonds MD    Electronically signed

## 2025-05-05 ENCOUNTER — ALLIED HEALTH/NURSE VISIT (OUTPATIENT)
Dept: DERMATOLOGY | Facility: CLINIC | Age: 75
End: 2025-05-05
Payer: MEDICARE

## 2025-05-05 DIAGNOSIS — Z48.01 ENCOUNTER FOR CHANGE OR REMOVAL OF SURGICAL WOUND DRESSING: Primary | ICD-10-CM

## 2025-05-05 PROCEDURE — 99207 PR NO CHARGE NURSE ONLY: CPT

## 2025-05-05 NOTE — PROGRESS NOTES
Narinder Edmonds comes into clinic today at the request of Dr. Edmonds Ordering Provider for Wound Check Action taken: See Below.    This service provided today was under the supervising provider of the day Dr. Edmonds, who was available if needed.    Pt returned to clinic for post surgery 1 week follow up bandage change. Pt has no complaints, denies pain. Bandage removed from left cheek, area cleansed with normal saline. Site is healing and wound edges approximating well. Reapplied new steri strips and paper tape.    Advised to watch for signs/sx of infection; spreading redness, drainage, odor, fever. Call or report promptly to clinic. Pt given written instructions and informed to rtc as needed. Patient verbalized understanding.    Libra Villalta on 5/5/2025 at 1:40 PM

## 2025-05-05 NOTE — PATIENT INSTRUCTIONS
Wound care instructions for  ONE WEEK AFTER SURGERY    Leave the bandage on for 1 week after today's bandage change.  In 1 week you may resume your regular skin care when you remove the dressing. This includes washing with mild soap and water, applying moisturizer, make-up and sunscreen.  If the wound is open or bleeding in any part of the incision/graft site, you should cover the area with a bandage daily as directed below:    Clean and dry area with plain water using a Q-tip or sterile gauze pad.  Apply vaseline, aquaphor, polysporin, or bacitracin ointment to the wound  Cover the wound with a band-aid or a sterile non-stick gauze pad and micropore paper tape.      Repeat the instructions above until wound is completely healed    If you notice that the scar is red and firm (after you remove the dressing) this is normal and will fade over time. It may take up to 6-12 months for this to occur.    Massaging the area helps the scar fade and soften quicker. Begin to massage the area one month after the dressings have been removed. Apply pressure directly and firmly over the scar with the fingertips and move in circular motions. You may massage up to 10 times daily, each time for 30 seconds.    It is normal for there to be a tender, pimple-like bump along the scar about 6-8 weeks after surgery. This sometimes happens as the scar matures and the stitches beneath begin to dissolve. Do not pick or squeeze. It will resolve in time. If an area of the wound does open and there appears to be drainage, you may apply one of the ointments listed in the above directions a few times every day until the wound is completely healed.    Numbness around the surgical site is normal. It can take up to 12-18 months for the feeling to return to normal. Itchiness, tingling, and occasional sharp pains might be present during this portion of the healing. All of these feelings will subside once the nerves have completely healed.      IN CASE OF  EMERGENCY: Dr Edmonds [phone]     If you were seen in [place] call: [phone]    If you were seen in [place] call: [phone]         Proper skin care from [facility]:    -Eliminate harsh soaps as they strip the natural oils from the skin, often resulting in dry itchy skin ( i.e. Dial, Zest, Rosa Isela Spring)  -Use mild soaps such as Cetaphil or Dove Sensitive Skin in the shower. You do not need to use soap on arms, legs, and trunk every time you shower unless visibly soiled.   -Avoid hot or cold showers.  -After showering, lightly dry off and apply moisturizing within 2-3 minutes. This will help trap moisture in the skin.   -Aggressive use of a moisturizer at least 1-2 times a day to the entire body (including -Vanicream, Cetaphil, Aquaphor or Cerave) and moisturize hands after every washing.  -We recommend using moisturizers that come in a tub that needs to be scooped out, not a pump. This has more of an oil base. It will hold moisture in your skin much better than a water base moisturizer. The above recommended are non-pore clogging.      Wear a sunscreen with at least SPF 30 on your face, ears, neck and V of the chest daily. Wear sunscreen on other areas of the body if those areas are exposed to the sun throughout the day. Sunscreens can contain physical and/or chemical blockers. Physical blockers are less likely to clog pores, these include zinc oxide and titanium dioxide. Reapply every two hour and after swimming.     Sunscreen examples: https://www.ewg.org/sunscreen/    UV radiation  UVA radiation remains constant throughout the day and throughout the year. It is a longer wavelength than UVB and therefore penetrates deeper into the skin leading to immediate and delayed tanning, photoaging, and skin cancer. 70-80% of UVA and UVB radiation occurs between the hours of 10am-2pm.  UVB radiation  UVB radiation causes the most harmful effects and is more significant during the summer months.  However, snow and ice can reflect UVB radiation leading to skin damage during the winter months as well. UVB radiation is responsible for tanning, burning, inflammation, delayed erythema (pinkness), pigmentation (brown spots), and skin cancer.     I recommend self monthly full body exams and yearly full body exams with a dermatology provider. If you develop a new or changing lesion please follow up for examination. Most skin cancers are pink and scaly or pink and pearly. However, we do see blue/brown/black skin cancers.  Consider the ABCDEs of melanoma when giving yourself your monthly full body exam ( don't forget the groin, buttocks, feet, toes, etc). A-asymmetry, B-borders, C-color, D-diameter, E-elevation or evolving. If you see any of these changes please follow up in clinic. If you cannot see your back I recommend purchasing a hand held mirror to use with a larger wall mirror.       Checking for Skin Cancer  You can find cancer early by checking your skin each month. There are 3 kinds of skin cancer. They are melanoma, basal cell carcinoma, and squamous cell carcinoma. Doing monthly skin checks is the best way to find new marks or skin changes. Follow the instructions below for checking your skin.   The ABCDEs of checking moles for melanoma   Check your moles or growths for signs of melanoma using ABCDE:   Asymmetry: the sides of the mole or growth don t match  Border: the edges are ragged, notched, or blurred  Color: the color within the mole or growth varies  Diameter: the mole or growth is larger than 6 mm (size of a pencil eraser)  Evolving: the size, shape, or color of the mole or growth is changing (evolving is not shown in the images below)    Checking for other types of skin cancer  Basal cell carcinoma or squamous cell carcinoma have symptoms such as:     A spot or mole that looks different from all other marks on your skin  Changes in how an area feels, such as itching, tenderness, or pain  Changes  in the skin's surface, such as oozing, bleeding, or scaliness  A sore that does not heal  New swelling or redness beyond the border of a mole    Who s at risk?  Anyone can get skin cancer. But you are at greater risk if you have:   Fair skin, light-colored hair, or light-colored eyes  Many moles or abnormal moles on your skin  A history of sunburns from sunlight or tanning beds  A family history of skin cancer  A history of exposure to radiation or chemicals  A weakened immune system  If you have had skin cancer in the past, you are at risk for recurring skin cancer.   How to check your skin  Do your monthly skin checkups in front of a full-length mirror. Check all parts of your body, including your:   Head (ears, face, neck, and scalp)  Torso (front, back, and sides)  Arms (tops, undersides, upper, and lower armpits)  Hands (palms, backs, and fingers, including under the nails)  Buttocks and genitals  Legs (front, back, and sides)  Feet (tops, soles, toes, including under the nails, and between toes)  If you have a lot of moles, take digital photos of them each month. Make sure to take photos both up close and from a distance. These can help you see if any moles change over time.   Most skin changes are not cancer. But if you see any changes in your skin, call your doctor right away. Only he or she can diagnose a problem. If you have skin cancer, seeing your doctor can be the first step toward getting the treatment that could save your life.   [employer] last reviewed this educational content on 4/1/2019 2000-2020 [employer]. [address] [place] [address], [place] [address]. All rights reserved. This information is not intended as a substitute for professional medical care. Always follow your healthcare professional's instructions.       When should I call my doctor?  If you are worsening or not improving, please, contact us or seek urgent care as noted below.     Who should I call with questions  (adults)?    [facility] [place] [phone]  For urgent needs outside of business hours call the [facility] at [phone] and ask for the dermatology resident on call to be paged  If this is a medical emergency and you are unable to reach an ER, Call 911      If you need a prescription refill, please contact your pharmacy. Refills are approved or denied by our Physicians during normal business hours, Monday through Friday.  Per office policy, refills will not be granted if you have not been seen within the past year (or sooner depending on the condition).